# Patient Record
Sex: FEMALE | Race: WHITE
[De-identification: names, ages, dates, MRNs, and addresses within clinical notes are randomized per-mention and may not be internally consistent; named-entity substitution may affect disease eponyms.]

---

## 2015-08-28 VITALS
DIASTOLIC BLOOD PRESSURE: 80 MMHG | SYSTOLIC BLOOD PRESSURE: 156 MMHG | SYSTOLIC BLOOD PRESSURE: 156 MMHG | DIASTOLIC BLOOD PRESSURE: 80 MMHG | DIASTOLIC BLOOD PRESSURE: 80 MMHG | DIASTOLIC BLOOD PRESSURE: 80 MMHG | DIASTOLIC BLOOD PRESSURE: 80 MMHG | SYSTOLIC BLOOD PRESSURE: 156 MMHG | DIASTOLIC BLOOD PRESSURE: 80 MMHG | SYSTOLIC BLOOD PRESSURE: 156 MMHG | SYSTOLIC BLOOD PRESSURE: 156 MMHG | SYSTOLIC BLOOD PRESSURE: 156 MMHG

## 2017-05-22 ENCOUNTER — HOSPITAL ENCOUNTER (OUTPATIENT)
Dept: HOSPITAL 6 - RAD | Age: 82
End: 2017-05-22
Payer: MEDICARE

## 2017-05-22 DIAGNOSIS — M25.551: ICD-10-CM

## 2017-05-22 DIAGNOSIS — M54.5: Primary | ICD-10-CM

## 2017-06-08 ENCOUNTER — HOSPITAL ENCOUNTER (OUTPATIENT)
Dept: HOSPITAL 6 - LAB | Age: 82
End: 2017-06-08
Payer: MEDICARE

## 2017-06-08 ENCOUNTER — HOSPITAL ENCOUNTER (OUTPATIENT)
Dept: HOSPITAL 19 - ZLAB.WCH | Age: 82
End: 2017-06-08

## 2017-06-08 DIAGNOSIS — E11.9: Primary | ICD-10-CM

## 2017-06-08 DIAGNOSIS — I10: ICD-10-CM

## 2017-06-08 DIAGNOSIS — Z01.89: Primary | ICD-10-CM

## 2017-06-15 ENCOUNTER — HOSPITAL ENCOUNTER (OUTPATIENT)
Dept: HOSPITAL 6 - PT | Age: 82
Discharge: HOME | End: 2017-06-15
Payer: MEDICARE

## 2017-06-15 DIAGNOSIS — M25.78: ICD-10-CM

## 2017-06-15 DIAGNOSIS — M47.9: ICD-10-CM

## 2017-06-15 DIAGNOSIS — M54.5: Primary | ICD-10-CM

## 2017-06-15 DIAGNOSIS — M25.551: ICD-10-CM

## 2017-06-15 DIAGNOSIS — M16.11: ICD-10-CM

## 2017-06-15 DIAGNOSIS — Z96.642: ICD-10-CM

## 2017-12-15 ENCOUNTER — HOSPITAL ENCOUNTER (OUTPATIENT)
Dept: HOSPITAL 6 - LAB | Age: 82
End: 2017-12-15
Attending: FAMILY MEDICINE
Payer: MEDICARE

## 2017-12-15 ENCOUNTER — HOSPITAL ENCOUNTER (OUTPATIENT)
Dept: HOSPITAL 19 - ZLAB.WCH | Age: 82
End: 2017-12-15

## 2017-12-15 DIAGNOSIS — Z01.89: Primary | ICD-10-CM

## 2017-12-15 DIAGNOSIS — E11.9: Primary | ICD-10-CM

## 2017-12-15 LAB
ERYTHROCYTE [DISTWIDTH] IN BLOOD BY AUTOMATED COUNT: 13.8 % (ref 11.5–14.5)
HCT VFR BLD AUTO: 44 % (ref 37–47)
HGB BLD-MCNC: 15 G/DL (ref 12.5–16)
MCH RBC QN AUTO: 30 PG (ref 27–31)
MCHC RBC AUTO-ENTMCNC: 34 G/DL (ref 33–37)
MCV RBC AUTO: 89 FL (ref 78–100)
PLATELET # BLD AUTO: 236 K/MM3 (ref 130–400)
PMV BLD AUTO: 11.6 FL (ref 7.4–10.4)
RBC # BLD AUTO: 4.96 M/MM3 (ref 4.1–5.3)
WBC # BLD AUTO: 6.1 K/MM3 (ref 4.8–10.8)

## 2018-12-05 ENCOUNTER — HOSPITAL ENCOUNTER (OUTPATIENT)
Dept: HOSPITAL 6 - LAB | Age: 83
End: 2018-12-05
Attending: FAMILY MEDICINE
Payer: MEDICARE

## 2018-12-05 DIAGNOSIS — E11.9: Primary | ICD-10-CM

## 2018-12-05 LAB
ERYTHROCYTE [DISTWIDTH] IN BLOOD BY AUTOMATED COUNT: 13.2 % (ref 11.5–14.5)
HCT VFR BLD AUTO: 45.3 % (ref 37–47)
HGB BLD-MCNC: 15.1 G/DL (ref 12.5–16)
MCH RBC QN AUTO: 30 PG (ref 27–31)
MCHC RBC AUTO-ENTMCNC: 33 G/DL (ref 33–37)
MCV RBC AUTO: 90 FL (ref 78–100)
PLATELET # BLD AUTO: 216 K/MM3 (ref 130–400)
PMV BLD AUTO: 11.8 FL (ref 7.4–10.4)
RBC # BLD AUTO: 5.03 M/MM3 (ref 4.1–5.3)
WBC # BLD AUTO: 6.1 K/MM3 (ref 4.8–10.8)

## 2018-12-07 ENCOUNTER — HOSPITAL ENCOUNTER (OUTPATIENT)
Dept: HOSPITAL 19 - ZLAB.WCH | Age: 83
End: 2018-12-07

## 2018-12-07 DIAGNOSIS — Z01.89: Primary | ICD-10-CM

## 2019-04-09 ENCOUNTER — HOSPITAL ENCOUNTER (OUTPATIENT)
Dept: HOSPITAL 6 - RAD | Age: 84
End: 2019-04-09
Attending: FAMILY MEDICINE
Payer: MEDICARE

## 2019-04-09 DIAGNOSIS — R06.2: ICD-10-CM

## 2019-04-09 DIAGNOSIS — I51.7: Primary | ICD-10-CM

## 2022-04-06 ENCOUNTER — HOSPITAL ENCOUNTER (INPATIENT)
Dept: HOSPITAL 19 - COL.ER | Age: 87
LOS: 4 days | Discharge: NURSING, REHAB OR LONG-TERM CARE | DRG: 480 | End: 2022-04-10
Attending: INTERNAL MEDICINE | Admitting: INTERNAL MEDICINE
Payer: MEDICARE

## 2022-04-06 VITALS — HEIGHT: 60 IN | BODY MASS INDEX: 41.16 KG/M2 | WEIGHT: 209.66 LBS

## 2022-04-06 VITALS — HEART RATE: 82 BPM | DIASTOLIC BLOOD PRESSURE: 46 MMHG | TEMPERATURE: 97.8 F | SYSTOLIC BLOOD PRESSURE: 138 MMHG

## 2022-04-06 VITALS — DIASTOLIC BLOOD PRESSURE: 73 MMHG | TEMPERATURE: 97.7 F | SYSTOLIC BLOOD PRESSURE: 148 MMHG | HEART RATE: 92 BPM

## 2022-04-06 DIAGNOSIS — E11.65: ICD-10-CM

## 2022-04-06 DIAGNOSIS — J96.01: ICD-10-CM

## 2022-04-06 DIAGNOSIS — Z20.822: ICD-10-CM

## 2022-04-06 DIAGNOSIS — D62: ICD-10-CM
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
## 2022-04-06 DIAGNOSIS — I48.91: ICD-10-CM

## 2022-04-06 DIAGNOSIS — Z66: ICD-10-CM

## 2022-04-06 DIAGNOSIS — J44.1: ICD-10-CM
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
## 2022-04-06 DIAGNOSIS — K21.9: ICD-10-CM

## 2022-04-06 DIAGNOSIS — S72.21XA: Primary | ICD-10-CM

## 2022-04-06 DIAGNOSIS — I50.33: ICD-10-CM

## 2022-04-06 DIAGNOSIS — N39.0: ICD-10-CM

## 2022-04-06 DIAGNOSIS — M19.90: ICD-10-CM

## 2022-04-06 DIAGNOSIS — I08.3: ICD-10-CM

## 2022-04-06 DIAGNOSIS — I49.1: ICD-10-CM
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
## 2022-04-06 DIAGNOSIS — Z79.4: ICD-10-CM

## 2022-04-06 DIAGNOSIS — Z87.891: ICD-10-CM

## 2022-04-06 DIAGNOSIS — I27.20: ICD-10-CM

## 2022-04-06 DIAGNOSIS — I11.0: ICD-10-CM

## 2022-04-06 DIAGNOSIS — E87.2: ICD-10-CM

## 2022-04-06 LAB
ALBUMIN SERPL-MCNC: 3.7 GM/DL (ref 3.4–4.8)
ALP SERPL-CCNC: 93 U/L (ref 40–150)
ALT SERPL-CCNC: 10 U/L (ref 0–55)
ANION GAP SERPL CALC-SCNC: 13 MMOL/L (ref 7–16)
AST SERPL-CCNC: 15 U/L (ref 5–34)
BASOPHILS # BLD: 0.1 K/MM3 (ref 0–0.2)
BASOPHILS NFR BLD AUTO: 0.6 % (ref 0–2)
BILIRUB SERPL-MCNC: 0.9 MG/DL (ref 0.2–1.2)
BUN SERPL-MCNC: 23 MG/DL (ref 10–20)
CALCIUM SERPL-MCNC: 8.9 MG/DL (ref 8.4–10.2)
CHLORIDE SERPL-SCNC: 102 MMOL/L (ref 98–107)
CO2 SERPL-SCNC: 22 MMOL/L (ref 23–31)
CREAT SERPL-SCNC: 0.95 MG/DL (ref 0.57–1.11)
EOSINOPHIL # BLD: 0 K/MM3 (ref 0–0.7)
EOSINOPHIL NFR BLD: 0.3 % (ref 0–4)
ERYTHROCYTE [DISTWIDTH] IN BLOOD BY AUTOMATED COUNT: 13.7 % (ref 11.5–14.5)
GLUCOSE SERPL-MCNC: 346 MG/DL (ref 70–99)
GLUCOSE UR QL STRIP.AUTO: (no result)
GRANULOCYTES # BLD AUTO: 89.3 % (ref 42.2–75.2)
HCT VFR BLD AUTO: 42.8 % (ref 37–47)
HGB BLD-MCNC: 14.1 G/DL (ref 12.5–16)
INR BLD: 1 (ref 0.8–3)
KETONES UR STRIP.AUTO-MCNC: (no result) MG/DL
LYMPHOCYTES # BLD: 0.5 K/MM3 (ref 1.2–3.4)
LYMPHOCYTES NFR BLD: 4.2 % (ref 20–51)
MCH RBC QN AUTO: 31 PG (ref 27–31)
MCHC RBC AUTO-ENTMCNC: 33 G/DL (ref 33–37)
MCV RBC AUTO: 93 FL (ref 80–100)
MONOCYTES # BLD: 0.6 K/MM3 (ref 0.1–0.6)
MONOCYTES NFR BLD AUTO: 4.8 % (ref 1.7–9.3)
NEUTROPHILS # BLD: 10.4 K/MM3 (ref 1.4–6.5)
PH UR STRIP.AUTO: 5 [PH] (ref 5–8)
PLATELET # BLD AUTO: 216 K/MM3 (ref 130–400)
PMV BLD AUTO: 12 FL (ref 7.4–10.4)
POTASSIUM SERPL-SCNC: 4.1 MMOL/L (ref 3.5–4.5)
PROT SERPL-MCNC: 6.6 GM/DL (ref 6.2–8.1)
PROTHROMBIN TIME: 11.1 SECONDS (ref 9.7–12.8)
RBC # BLD AUTO: 4.62 M/MM3 (ref 4.1–5.3)
RBC # UR: (no result) /HPF (ref 0–2)
SODIUM SERPL-SCNC: 137 MMOL/L (ref 136–145)
SP GR UR STRIP.AUTO: 1.01 (ref 1–1.03)
SQUAMOUS # URNS: (no result) /HPF (ref 0–10)
TROPONIN I SERPL-MCNC: 0.03 NG/ML (ref 0–0.03)
URN COLLECT METHOD CLASS: (no result)

## 2022-04-06 PROCEDURE — C1713 ANCHOR/SCREW BN/BN,TIS/BN: HCPCS

## 2022-04-06 PROCEDURE — C1769 GUIDE WIRE: HCPCS

## 2022-04-06 PROCEDURE — A9284 NON-ELECTRONIC SPIROMETER: HCPCS

## 2022-04-06 NOTE — NUR
PATIENT REQUESTING TO EAT, CALLED PROVIDER, ORDER OBTAINED TO FEED PATIENT
THEN MAKE PATIENT NPO AFTER MIDNIGHT.

## 2022-04-06 NOTE — NUR
PATIENT ARRIVED TO ROOM VIA E.R. CART/PCT ACCOMPANIED PATIENT. TELE IN PLACE.
OXYGEN PER OXIMASK. PATIENT VOMITED SMALL AMOUNT LIGHT GREEN EMESIS CLEAR WITH
SOME SMALL FLECKS OF UNDIGESTED FOOD

## 2022-04-07 VITALS — HEART RATE: 83 BPM | SYSTOLIC BLOOD PRESSURE: 111 MMHG | DIASTOLIC BLOOD PRESSURE: 47 MMHG

## 2022-04-07 VITALS — SYSTOLIC BLOOD PRESSURE: 122 MMHG | DIASTOLIC BLOOD PRESSURE: 53 MMHG | HEART RATE: 83 BPM

## 2022-04-07 VITALS — HEART RATE: 80 BPM | SYSTOLIC BLOOD PRESSURE: 126 MMHG | DIASTOLIC BLOOD PRESSURE: 50 MMHG

## 2022-04-07 VITALS — HEART RATE: 81 BPM | TEMPERATURE: 98.8 F | SYSTOLIC BLOOD PRESSURE: 121 MMHG | DIASTOLIC BLOOD PRESSURE: 58 MMHG

## 2022-04-07 VITALS — SYSTOLIC BLOOD PRESSURE: 131 MMHG | HEART RATE: 78 BPM | TEMPERATURE: 98.4 F | DIASTOLIC BLOOD PRESSURE: 37 MMHG

## 2022-04-07 VITALS — SYSTOLIC BLOOD PRESSURE: 140 MMHG | HEART RATE: 82 BPM | TEMPERATURE: 97.6 F | DIASTOLIC BLOOD PRESSURE: 32 MMHG

## 2022-04-07 VITALS — HEART RATE: 79 BPM | DIASTOLIC BLOOD PRESSURE: 49 MMHG | SYSTOLIC BLOOD PRESSURE: 130 MMHG

## 2022-04-07 VITALS — DIASTOLIC BLOOD PRESSURE: 40 MMHG | HEART RATE: 78 BPM | SYSTOLIC BLOOD PRESSURE: 131 MMHG

## 2022-04-07 VITALS — HEART RATE: 80 BPM | SYSTOLIC BLOOD PRESSURE: 139 MMHG | DIASTOLIC BLOOD PRESSURE: 43 MMHG

## 2022-04-07 VITALS — HEART RATE: 88 BPM | SYSTOLIC BLOOD PRESSURE: 124 MMHG | DIASTOLIC BLOOD PRESSURE: 61 MMHG | TEMPERATURE: 98.5 F

## 2022-04-07 VITALS — HEART RATE: 78 BPM | SYSTOLIC BLOOD PRESSURE: 130 MMHG | DIASTOLIC BLOOD PRESSURE: 49 MMHG

## 2022-04-07 PROCEDURE — 0QS636Z REPOSITION RIGHT UPPER FEMUR WITH INTRAMEDULLARY INTERNAL FIXATION DEVICE, PERCUTANEOUS APPROACH: ICD-10-PCS | Performed by: ORTHOPAEDIC SURGERY

## 2022-04-07 NOTE — NUR
PT TAKEN TO SURGERY @ THIS TIME. PT HAS BEEN GIVEN A BED BATH, GOWN HAS BEEN
CHANGED ET PT REPOSITIONED IN BED. PT STATES THAT SHE HAS NO PAIN WHEN LAYING
STILL BUT HAS SEVERE PAIN WITH MOVEMENT. PT BEGINS TO BECOME NAUSEOUS WHEN
HAVING PAIN ET REPOSITIONING IN BED. SHAH CATHETER IN PLACE ET DRAINING
DEPENDENTLY.

## 2022-04-07 NOTE — NUR
RECEIVED REPORT FROM PACU RNANABELLA. WAITING FOR PATIENT ARRIVAL TO ROOM FROM
RECOVERY ROOM. BOTH DAUGHTERS IN ROOM WAITING FOR PATIENT.

## 2022-04-07 NOTE — NUR
met with patient to discuss discharge planning. Patient has a
hip fracture and has not had surgery scheduled at this time. Patient lives
outside of [place] with her daughter, Janie (ph#[phone]) and sees Dr. Almanzar
for primary care. Patient obtains medications from [facility] with no
difficulties and uses a cane for ambulation. Patient reports she is normally
independent with ADLS. SW discussed post acute rehab with patient who is
agreeable that she will need this at time of discharge. Patient's preferences
for rehab are 1) [facility] and 2) [facility]. MIRANDA contacted Cyn at
[facility] and gave referral. MIRANDA also contacted Mahogany at  and faxed referral. SW
contacted patient's daughter, Janie to provide update. Janie advised she will
bring in copy of patient's DPOA-HC and would like to be notified when
patient's surgery is scheduled. MIRANDA provided this request to RN.
 
Discharge Plan: Post acute rehab

## 2022-04-07 NOTE — NUR
PATIENT TO ROOM PER HOSP BED WITH PACU RN TRANSPORTING PATIENT. PATIENT
DROWSY POST OP BUT AROUSES TO NAME, DAUGHTERS VISITED WITH PATIENT BRIEFLY.
DENIES SURGICAL PAIN/NAUSEA CURRENTLY. TELE IN PLACE. IV FLUIDS RUNNING
SLOWLY. SHAH IN PLACE WITH CLEAR MED YELLOW URINE AT THIS TIME.

## 2022-04-08 VITALS — HEART RATE: 76 BPM | SYSTOLIC BLOOD PRESSURE: 126 MMHG | DIASTOLIC BLOOD PRESSURE: 43 MMHG | TEMPERATURE: 98.6 F

## 2022-04-08 VITALS — TEMPERATURE: 98.3 F | HEART RATE: 77 BPM | DIASTOLIC BLOOD PRESSURE: 42 MMHG | SYSTOLIC BLOOD PRESSURE: 128 MMHG

## 2022-04-08 VITALS — HEART RATE: 75 BPM | TEMPERATURE: 97.6 F | SYSTOLIC BLOOD PRESSURE: 112 MMHG | DIASTOLIC BLOOD PRESSURE: 49 MMHG

## 2022-04-08 VITALS — HEART RATE: 85 BPM | TEMPERATURE: 99.1 F | DIASTOLIC BLOOD PRESSURE: 45 MMHG | SYSTOLIC BLOOD PRESSURE: 130 MMHG

## 2022-04-08 VITALS — TEMPERATURE: 99.2 F | SYSTOLIC BLOOD PRESSURE: 111 MMHG | DIASTOLIC BLOOD PRESSURE: 39 MMHG | HEART RATE: 81 BPM

## 2022-04-08 VITALS — HEART RATE: 82 BPM | SYSTOLIC BLOOD PRESSURE: 130 MMHG | DIASTOLIC BLOOD PRESSURE: 55 MMHG | TEMPERATURE: 98.2 F

## 2022-04-08 VITALS — TEMPERATURE: 97.8 F

## 2022-04-08 NOTE — NUR
CONFIRMED WITH ON CALL HOSP REGARDING IV FLUIDS&MEDS POST OP. PROVIDER OKAY
FOR IV FLUIDS TO CONTINUE UNTIL PATIENT IS MORE ALERT AND POST N/V TO RUN AT
50 ML/HR.

## 2022-04-08 NOTE — NUR
faxed clinical updates to patient's second preference, Arsen Sheht. [facility] to view updates electronically.

## 2022-04-08 NOTE — NUR
Message left for Monse at [facility]. Notified Monse of possible discharge on
Sunday.

## 2022-04-08 NOTE — NUR
PT USES CALL LIGHT TO USE BR. PT HAS BEEN UP TO CHAIR WITH PHYSICAL THERAPY
ONCE TODAY, IS ENCOURAGED TO STAND ET USE BSC. PT REQUIRES MAX 2 ASSIST TO SIT
UP IN BED ET GET FEET ON FLOOR. PT IS ABLE TO STAND BUT IS UNABLE TO TAKE
STEPS. PT IS ASSISTED BACK INTO BED ET PLACED ONTO BEDPAN TO URINATE. PT IS
INSTRUCTED TO USE THE CALL LIGHT WHEN FINISHED, VERBALIZES UNDERTSTANDING.
CALL LIGHT WITHIN REACH.

## 2022-04-08 NOTE — NUR
IV SALINE LOCKED TO INT @ THIS TIME. PT IS RESTING QUIETLY IN BED, DENIES PAIN
@ REST, A&OX3. DRINKING WATER ET DENIES NAUSEA. BREAKFAST HAS BEEN ORDERED
FROM KITCHEN. PT DENIES SOB, O2 ON @ 3L NC. BED ALARM IS ON. CALL LIGHT WITHIN
REACH.

## 2022-04-08 NOTE — NUR
MIRANDA spoke with Kimberlee at [facility]. Patient's admission on Sunday still pending
review from weekend physician. Notified Kimberlee that i will be on tomorrow and
will touch base again with nursing staff.

## 2022-04-09 VITALS — HEART RATE: 81 BPM | SYSTOLIC BLOOD PRESSURE: 125 MMHG | DIASTOLIC BLOOD PRESSURE: 87 MMHG | TEMPERATURE: 97.8 F

## 2022-04-09 VITALS — HEART RATE: 96 BPM | DIASTOLIC BLOOD PRESSURE: 73 MMHG | TEMPERATURE: 98.3 F | SYSTOLIC BLOOD PRESSURE: 161 MMHG

## 2022-04-09 VITALS — SYSTOLIC BLOOD PRESSURE: 124 MMHG | TEMPERATURE: 97.7 F | HEART RATE: 84 BPM | DIASTOLIC BLOOD PRESSURE: 40 MMHG

## 2022-04-09 VITALS — HEART RATE: 85 BPM | DIASTOLIC BLOOD PRESSURE: 46 MMHG | TEMPERATURE: 98 F | SYSTOLIC BLOOD PRESSURE: 156 MMHG

## 2022-04-09 VITALS — HEART RATE: 118 BPM | TEMPERATURE: 99 F | DIASTOLIC BLOOD PRESSURE: 70 MMHG | SYSTOLIC BLOOD PRESSURE: 134 MMHG

## 2022-04-09 VITALS — HEART RATE: 81 BPM | TEMPERATURE: 97.8 F | DIASTOLIC BLOOD PRESSURE: 37 MMHG | SYSTOLIC BLOOD PRESSURE: 138 MMHG

## 2022-04-09 LAB
ANION GAP SERPL CALC-SCNC: 10 MMOL/L (ref 7–16)
BASOPHILS # BLD: 0 K/MM3 (ref 0–0.2)
BASOPHILS NFR BLD AUTO: 0.1 % (ref 0–2)
BUN SERPL-MCNC: 44 MG/DL (ref 10–20)
CALCIUM SERPL-MCNC: 8.2 MG/DL (ref 8.4–10.2)
CHLORIDE SERPL-SCNC: 105 MMOL/L (ref 98–107)
CO2 SERPL-SCNC: 26 MMOL/L (ref 23–31)
CREAT SERPL-SCNC: 1.29 MG/DL (ref 0.57–1.11)
EOSINOPHIL # BLD: 0.1 K/MM3 (ref 0–0.7)
EOSINOPHIL NFR BLD: 0.6 % (ref 0–4)
ERYTHROCYTE [DISTWIDTH] IN BLOOD BY AUTOMATED COUNT: 14.2 % (ref 11.5–14.5)
GLUCOSE SERPL-MCNC: 93 MG/DL (ref 70–99)
GRANULOCYTES # BLD AUTO: 78.9 % (ref 42.2–75.2)
HCT VFR BLD AUTO: 31.2 % (ref 37–47)
HGB BLD-MCNC: 10.5 G/DL (ref 12.5–16)
LYMPHOCYTES # BLD: 0.8 K/MM3 (ref 1.2–3.4)
LYMPHOCYTES NFR BLD: 7.5 % (ref 20–51)
MAGNESIUM SERPL-MCNC: 2.3 MG/DL (ref 1.6–2.6)
MCH RBC QN AUTO: 31 PG (ref 27–31)
MCHC RBC AUTO-ENTMCNC: 34 G/DL (ref 33–37)
MCV RBC AUTO: 92 FL (ref 80–100)
MONOCYTES # BLD: 1.3 K/MM3 (ref 0.1–0.6)
MONOCYTES NFR BLD AUTO: 12.6 % (ref 1.7–9.3)
NEUTROPHILS # BLD: 8 K/MM3 (ref 1.4–6.5)
PLATELET # BLD AUTO: 180 K/MM3 (ref 130–400)
PMV BLD AUTO: 12.1 FL (ref 7.4–10.4)
POTASSIUM SERPL-SCNC: 4.1 MMOL/L (ref 3.5–4.5)
RBC # BLD AUTO: 3.4 M/MM3 (ref 4.1–5.3)
SODIUM SERPL-SCNC: 141 MMOL/L (ref 136–145)

## 2022-04-09 NOTE — NUR
spoke with Kimberlee at [facility]. She is going to check with the
accepting physician today to determine if they are able to accept the patient
for transfer tomorrow. She will call back once she has an answer.

## 2022-04-09 NOTE — NUR
PATIENT ALERT AND ORIENTED BUT DROWSY. X2 ASSIST TO BEDPAN. C/O PAIN ONLY WHEN
MOVING, OTHERWISE DENIES PAIN. MEDS ADMINISTERED PER EMAR. X3 GAMMA INCISIONS
ARE CDI WITH GAUZE AND TEGADERM. BLOOD SUGAR 186, SSI PER ORDER. REMAINS ON 3
L O2 VIA NC.

## 2022-04-09 NOTE — NUR
PATIENT C/O HEARTBURN, CALL TO [facility] AND ORDER FOR PROTONIX NOW. NO RELIEF
AFTER THE PROTONIX AND ADDITIONAL CALL PLACED TO [facility]. GI COCKTAIL ORDERED
AND ADMINISTERED PER EMAR. STILL NO RELIEF. NOTICED ON TELE MONITOR PT HR IN
30s. PATIENT ALERT AND ORIENTED BUT FATIGUED AND C/O HEARTBURN STILL. VSS.
BLOOD SUGAR WNL. ORDER FOR EKG. CALL PLACED TO [facility] TO NOTIFY OF PT STATUS,
STAT LABS ORDERED. AWAITING FOR THE RESULTS.

## 2022-04-09 NOTE — NUR
PT UP TO RECLINER WITH MAX ASSIST X3. UNABLE TO BEAR WEIGHT THROUGH LEGS.
PASSED ANA AND VOIDED RETURNED TO RECLINER. BILATERAL DIABETIC VENOUS STASIS
TO LOWER EXT. NO OOZING OR WEAPING NOTED. LEGS OPEN TO AIR AT THIS TIME.

## 2022-04-09 NOTE — NUR
PATIENT ALERT AND ORIENTED. IV TO R UPPER ARM INFILTRATED AND REMOVED,
CATHETER INTACT. RESTARTED 22 G X1 ATTEMPT TO L HAND. BLOOD SUGAR 193, SSI PER
ORDER. REMAINS ON 2 L O2 VIA NC. X3 INCISIONS TO R GAMMA NAIL SITE CDI WITH
GAUZE AND TEGADERM. BLE ELEVATED AND OPEN TO AIR. DENYING PAIN AS LONG AS SHE
IS IN BED. PUREWICK IN PLACE TO LOW CONTINUOUS SUCTION.

## 2022-04-10 ENCOUNTER — HOSPITAL ENCOUNTER (INPATIENT)
Dept: HOSPITAL 6 - MED/SURG | Age: 87
LOS: 31 days | Discharge: HOME HEALTH | DRG: 561 | End: 2022-05-11
Attending: GENERAL PRACTICE | Admitting: GENERAL PRACTICE
Payer: MEDICARE

## 2022-04-10 VITALS — HEART RATE: 89 BPM | TEMPERATURE: 98.5 F | DIASTOLIC BLOOD PRESSURE: 47 MMHG | SYSTOLIC BLOOD PRESSURE: 144 MMHG

## 2022-04-10 VITALS — SYSTOLIC BLOOD PRESSURE: 125 MMHG | DIASTOLIC BLOOD PRESSURE: 37 MMHG | HEART RATE: 82 BPM | TEMPERATURE: 99 F

## 2022-04-10 VITALS — SYSTOLIC BLOOD PRESSURE: 147 MMHG | DIASTOLIC BLOOD PRESSURE: 80 MMHG

## 2022-04-10 VITALS — SYSTOLIC BLOOD PRESSURE: 125 MMHG | TEMPERATURE: 98.1 F | HEART RATE: 88 BPM | DIASTOLIC BLOOD PRESSURE: 36 MMHG

## 2022-04-10 VITALS — HEIGHT: 62.99 IN | BODY MASS INDEX: 32.62 KG/M2 | WEIGHT: 184.09 LBS

## 2022-04-10 DIAGNOSIS — S72.91XD: Primary | ICD-10-CM

## 2022-04-10 DIAGNOSIS — I87.8: ICD-10-CM

## 2022-04-10 DIAGNOSIS — Z96.642: ICD-10-CM

## 2022-04-10 DIAGNOSIS — G47.00: ICD-10-CM

## 2022-04-10 DIAGNOSIS — I48.91: ICD-10-CM

## 2022-04-10 DIAGNOSIS — I11.0: ICD-10-CM

## 2022-04-10 DIAGNOSIS — E11.9: ICD-10-CM

## 2022-04-10 DIAGNOSIS — Z79.891: ICD-10-CM

## 2022-04-10 DIAGNOSIS — E87.6: ICD-10-CM

## 2022-04-10 DIAGNOSIS — I50.9: ICD-10-CM

## 2022-04-10 DIAGNOSIS — E66.9: ICD-10-CM

## 2022-04-10 DIAGNOSIS — Z79.4: ICD-10-CM

## 2022-04-10 DIAGNOSIS — M17.11: ICD-10-CM
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
## 2022-04-10 LAB
ANION GAP SERPL CALC-SCNC: 8 MMOL/L (ref 7–16)
BUN SERPL-MCNC: 44 MG/DL (ref 10–20)
CALCIUM SERPL-MCNC: 8.2 MG/DL (ref 8.4–10.2)
CHLORIDE SERPL-SCNC: 104 MMOL/L (ref 98–107)
CO2 SERPL-SCNC: 28 MMOL/L (ref 23–31)
CREAT SERPL-SCNC: 1 MG/DL (ref 0.57–1.11)
GLUCOSE SERPL-MCNC: 64 MG/DL (ref 70–99)
HCT VFR BLD AUTO: 28.8 % (ref 37–47)
HGB BLD-MCNC: 9.4 G/DL (ref 12.5–16)
MAGNESIUM SERPL-MCNC: 2.3 MG/DL (ref 1.6–2.6)
POTASSIUM SERPL-SCNC: 3.8 MMOL/L (ref 3.5–4.5)
SODIUM SERPL-SCNC: 140 MMOL/L (ref 136–145)

## 2022-04-10 NOTE — NUR
Per Dr. Betancourt, patient to have fabio boots applied tomorrow during the day for
BLE edema and skin issues.

## 2022-04-10 NOTE — NUR
Patient resting in bed alert and oriented x 4. Denies pain at rest. 2 assist
onto bed pain with no results. Good caleb-care provided and pure wick applied
to suction. Patient has red cracked skin to right groin crease and panus.
Areas cleaned and nystatin powder applied. Accu check 158 and Dr. Betancourt
notified.

## 2022-04-10 NOTE — NUR
informed patient is ready fr transfer/discharge. SW contacted
[facility] to confirm their census is capped and unable to take today. This
 contacted [facility]; Jeana informs due to no
communication, they are unable to accept today, but possibly tomorrow. Rene GARCIA, Natalie Marion Supervisor notified.
 
15:43: Natalie Marion Supervisor informs has spoken to [facility], and patient
bed available. Peer to peer communication completed and patient accepted. SW
collaborated with Natalie Marion Supervisor, Dr. Andres, Rene GARCIA, and [facility] to coordinate patient transport. EMS transfer orders placed in
patient chart, copy sent with pt clinical transfer packet. Patient daughter
Janie [phone] updated on plan of care and is in support.
 
*Discharge plan: Patient transfer via [facility] to [facility].*

## 2022-04-10 NOTE — NUR
REPORT TO PEREZ GARCIA @[facility]. PT TO TRANSFER TO [facility] PER EMS AT THIS
TIME.

## 2022-04-10 NOTE — NUR
Patient resting in bed and awakened by nurse. Alert to self, , month, time
of day and year. States she's in a care home and reoriented to in [facility]. Speech
clear and appropriate. Denies pain. Up to the bathroom with steady gait and
brushes teeth. Rests back in bed. HS meds reviewed and swallowed without
problems. Lights turned out. Bed alarm on and call light in reach. States her
friends brought in cream cup earlier and declines snack.

## 2022-04-10 NOTE — NUR
PATIENT ADMIT FROM [facility]IA [facility] POST STATUS RIGHT HIP REPAIR. PATIENT
IS A&0X4. PATIENT IS A 2-3 ASSIST, DENIES PAIN WHEN LYING IN BED, STATES PAIN
WITH MOVEMENT OF 8/10. PATIENT BLE NOTED WITH SCALING/SCABBING FROM MID SHIN
TO TOES. PROVIDER AWARE. PATIENT/FAMILY ORIENTED TO ROOM/FACILITY. ALL
QUESTIONS ANSWERED. PATIENT RESTING IN BED WITH FAMILY AT BEDSIDE.  PATIENT
DENIES NEEDS OR COMPLAINTS AT THIS TIME. BED IN LOWEST LOCKED POSTION, CALL
LIGHT WITHIN REACH.

## 2022-04-10 NOTE — NUR
Dr. Betancourt into see patient and patient assisted onto bedpan. Voids but no bm.
HS meds reviewed and given along with tylenol for pain prevention. Reports
has achy pain right hip with movement. Took milk for snack.

## 2022-04-10 NOTE — NUR
PT UP TO BSC WITH MAX ASSIST X2. VOIDED AND HAD MED BM. PT REPORTS HAVING
SEVERAL STOOLS SINCE RECIEVING BOWEL MEDICATIONS. PT'S BLE WITH DIABETIC
VENOUS STASIS ULCERS DRY WITH NO DRAINAGE NOTED. PLAN ON TRANSFER TO SWING BED
OR REHAB FACILITY LATER TODAY.

## 2022-04-11 VITALS — DIASTOLIC BLOOD PRESSURE: 78 MMHG | SYSTOLIC BLOOD PRESSURE: 152 MMHG

## 2022-04-11 VITALS — SYSTOLIC BLOOD PRESSURE: 153 MMHG | DIASTOLIC BLOOD PRESSURE: 65 MMHG

## 2022-04-11 LAB
APPEARANCE UR: CLEAR
BASOPHILS # BLD: 0.02 K/MM3 (ref 0.02–0.1)
BILIRUB UR QL STRIP.AUTO: NEGATIVE
CALCIUM SERPL-MCNC: 8.8 MG/DL (ref 8.3–10.5)
CO2 SERPL-SCNC: 26 MMOL/L (ref 23–31)
COLOR UR AUTO: YELLOW
EOSINOPHIL # BLD: 0.16 K/MM3 (ref 0.04–0.4)
EOSINOPHIL NFR BLD: 1.8 % (ref 1–5)
ERYTHROCYTE [DISTWIDTH] IN BLOOD BY AUTOMATED COUNT: 13.9 % (ref 11.5–14.5)
GLUCOSE SERPL-MCNC: 226 MG/DL (ref 65–105)
GLUCOSE UR QL STRIP.AUTO: NEGATIVE
HCT VFR BLD AUTO: 33.1 % (ref 37–47)
HGB BLD-MCNC: 10.7 G/DL (ref 12.5–16)
KETONES UR QL STRIP.AUTO: NEGATIVE
LEUKOCYTE ESTERASE UR QL STRIP: NEGATIVE
LYMPHOCYTES # BLD: 0.62 K/MM3 (ref 1.5–4)
MCH RBC QN AUTO: 31 PG (ref 27–31)
MCHC RBC AUTO-ENTMCNC: 32 G/DL (ref 33–37)
MCV RBC AUTO: 94 FL (ref 78–100)
MONOCYTES # BLD: 1 K/MM3 (ref 0.2–0.8)
MUCOUS THREADS URNS QL MICRO: PRESENT
NEUTROPHILS # BLD: 7.05 K/MM3 (ref 1.4–6.5)
NITRITE UR QL STRIP: NEGATIVE
PH UR STRIP.AUTO: 5 [PH] (ref 5–8)
PLATELET # BLD AUTO: 187 K/MM3 (ref 130–400)
PMV BLD AUTO: 11.9 FL (ref 7.4–10.4)
POTASSIUM SERPL-SCNC: 4.2 MMOL/L (ref 3.5–5.1)
PROT ?TM UR-MCNC: (no result) MG/DL
RBC # BLD AUTO: 3.51 M/MM3 (ref 4.1–5.3)
RBC UR QL AUTO: NEGATIVE
SODIUM SERPL-SCNC: 140 MMOL/L (ref 136–145)
SP GR UR STRIP.AUTO: 1.01 (ref 1–1.03)
UROBILINOGEN UR-MCNC: NORMAL MG/DL
WBC # BLD AUTO: 9 K/MM3 (ref 4.8–10.8)
WBC #/AREA URNS HPF: (no result) /HPF (ref 0–3)

## 2022-04-11 NOTE — NUR
[facility] called and cancelled Rima's appointment on 4/15/22 at 2 will
rescedule follow up before discharges from [facility].

## 2022-04-11 NOTE — NUR
RELATING TO SKIN CARE. PT WAS SITTING IN STOOL UPON ENTERING . PT ASSISTED
TO BED OUT OF CHAIR WITH SIT TO STAND DEVICE WITH 2 PERSON ASSIST. PT CLEANED.
A LARGE SKIN TAG IS NOTED ON RT BUTTOCKS THE SKIN TAG HAS A BLOOD BLISTER AND
IS RED. SKIN BOND WAS APPLIED AFTER CLEANSING OF AREA. SKIN BETWEEN BUTTOCKS
RED. PT HAS RAW SKIN IN FOLDS OF GROIN. THESE WERE CLEANSED AND ANTIFUNGAL
POWDER PLACED. PT HAS 3 BANDAGES ON RT UPPER LATERAL LEG. THERE IS A FLUID
FILLED BLISTER NOTED NEAR MOST SUPERIOR BANDAGE. ONCE SETTLED IN BED AND
CLEANED A PUREWICK DEVICE WAS PLACED AND PT TURNED TO LT SIDE.

## 2022-04-11 NOTE — NUR
Called [facility] in [place] - Dr. Umanzor to cancel Rima's
appointment on 4/15// at 2 pm.  Will reschedule appointment for an
after discharge day and time.

## 2022-04-11 NOTE — NUR
Patient initially felt she needed to have bm but then urinated via pure wick
catheter and sensation left. Was going to get cath ua but voided at this time.
Tylenol given for 8/10 pain to right hip with movement. Dull pain with slow
movement and stabbing pain with quick movements. Patients states "yes" to
sleeping well this noc.

## 2022-04-12 VITALS — SYSTOLIC BLOOD PRESSURE: 144 MMHG | DIASTOLIC BLOOD PRESSURE: 68 MMHG

## 2022-04-12 VITALS — SYSTOLIC BLOOD PRESSURE: 169 MMHG | DIASTOLIC BLOOD PRESSURE: 75 MMHG

## 2022-04-12 NOTE — NUR
PATIENT UP TO CHIAR FOR EVENING MEAL, FEET ELEVATED ON STOOL. PATIENT
REMAINS PLEASENT AND COOPERATIVE WITH CARES. PRN APAP GIVEN FOR PAIN
THROUGHOUT THE DAY. PATIENT USING SIT TO STAND LIFT FOR TRANSFERS WITHOUT
DIFFICULTY. PATIENT OFFERS NO NEEDS OR COMPLAINTS AT THIS TIME. CHAIR ALARM
ON. CALL LIGHT WITHIN REACH.

## 2022-04-12 NOTE — NUR
REPORT RECEIVED FROM ARDHA SHEA. PATIENT RESTING IN BED WITH EYES CLOSED. BED
IN LOWEST LOCKED POSTION, CALL LIGHT WITHIN REACH.

## 2022-04-12 NOTE — NUR
IN REGUARDS TO PT SKIN. RT LATERAL UPPER LEG IS EDEMITIS WITH 3 SMALL
DRESSING. BLISTER NEAR UPPER DRESSING IS NO LONGER FLUID FILLED. LT UPPER LEG
IS EDEMITIS. LARGE SKIN TAG OR GROWTH ON RT BUTTOCKS HAS BRUISING AND IN SKIN
FOLDS CREASES ARE RED AND RAW. PT HAS NACHO BOOTS IN PLACE BILATERALLY FOR DRY
SCALEY SKIN. PT DENIES PAIN AT THIS TIME.

## 2022-04-12 NOTE — NUR
PATIENT UP TO CHAIR FOR MORNING MEAL. PLEASENT AND COOPERATIVE WITH CARES.
STATES PAIN PRESENT WITH MOVEMENT, NOT WHILE "SITTING STILL". PATIENT HAS C/O
NAUSEA, PRN ZOFRAN GIVEN. PATIENT DENIES OTHER NEEDS OR COMPLAINTS AT THIS
TIME. R HIP INCISION DRESSINGS CLEAN DRY AND INTACT, NACHO BOOTS ON BLE. CHAIR
ALARM ON, CALL LIGHT WITHIN REACH.

## 2022-04-13 VITALS — DIASTOLIC BLOOD PRESSURE: 64 MMHG | SYSTOLIC BLOOD PRESSURE: 125 MMHG

## 2022-04-13 VITALS — SYSTOLIC BLOOD PRESSURE: 134 MMHG | DIASTOLIC BLOOD PRESSURE: 57 MMHG

## 2022-04-13 NOTE — NUR
Rings call light. "Just can't get comfortable". Denies pain. Adjusted in bed
by staff. Heels floated on pillow. Tylenol 650 MG given.

## 2022-04-13 NOTE — NUR
Bladder scan patient as she had not voided since 0745, revealed 680 mls total,
pt able to get up with therapy and assist of 2 staff to stand and then placed
commode behind her and she was able to void 250 mls and have liquid stool. Pt
did not tolerate pain well, used sit-to-stand to get back to bed and do
therapy in bed with PT. PRN meds have been given.

## 2022-04-13 NOTE — NUR
Pt rested this afternoon, able to nap for some time but WBG low upon waking,
was 40. Snack provided and pt feeling better, able to get up to commode with
sit-to-stand lift and then to rcliner, did void this afternoon on commode. PRN
pain meds given over shift and educated on need to manage pain so she is
willing to move and get to commode, chair and work with therapy. Pt agreeable
to rotating pain meds and trying to keep moving. PRN nausea meds given at
times over shift for nausea that starts after moving, Denies needs, will give
report to nightshift nurse who will resume care.

## 2022-04-13 NOTE — NUR
Rings call light and requests that her blood sugar be check. Accu-check 122,
states she usually runs in the 200's. Unable to get comfortable. Staff
repositions. SAO2 99% on 2.5 L/NC. Oxygen turned down to 1.5 L/NC. Patient
states she does not wear oxygen at home.

## 2022-04-13 NOTE — NUR
Assessment charted. Pt on commode, had loose brown BM and states she also
urinated in commode. Sit-to-stand used to g et pt up and cleaned, pericare
provided, cream applied to sacral area and powder to pannus. R superior
surgical site has some blistering from previous dressing. Distal sites are
CDI with gauze and tegaderm. Pt feeling nauseated from using lift, will
provide PRN nausea and pain meds for discomfort in R hip. Nakita boots
bilaterally for scaley legs. Will continue to monitor.

## 2022-04-13 NOTE — NUR
Patients accu check 139 and scheduled levemir given. HS meds along with
roxycodone for right hip pain and zofran to prevent nausea given. 2 assist via
sit to stand lift to BSC and voids 200mls mega concentrated urine mixed with
small loose bm. Susana-care done. Max assist to rest back in bed. BLE elevated
on pillows. HS snack was given earlier.

## 2022-04-14 VITALS — SYSTOLIC BLOOD PRESSURE: 135 MMHG | DIASTOLIC BLOOD PRESSURE: 68 MMHG

## 2022-04-14 VITALS — SYSTOLIC BLOOD PRESSURE: 151 MMHG | DIASTOLIC BLOOD PRESSURE: 77 MMHG

## 2022-04-14 NOTE — NUR
Pt alert and oriented x4. Pt states had diarrhea this morning and refuses
colace. Pt states she is at 0/10 for pain if she doesn't move.  She rates pain
at 7/10 with movement. Advised pt that movement is necessary to keep ROM,
avoid constipation, & keep blood flowing for healing.  Pt voices
understanding. Pt swallowed pills well.
 
Pt was tearful and states that she feels like she is "taking up
space that someone else could use better". Pt c/o feeling "useless" & states
that her "daughter made a mistake by coming home to care for her father and
now me." Pt apologized for being tearful.  This nurse advised that this time
with her and her daughter are important for both of them and encouraged the pt
to talk and voice her feelings. Pt voiced understanding and agreement.
 
This nurse adjusted BLE on pillow and elevated.  Pt resting in chair.  Chair
alarm on and call light within reach.

## 2022-04-14 NOTE — NUR
Report received from Lilliana /Student RN. Patient resting supine in bed.
Oxygen increased from 0.5L/NC to 2L/NC due to elevated carboxyhemoglobin level
of 2.8 from facility gas leak. Patient made aware of this. A/O x4. Denies pain
at this time. Denies nausea. Discussed taking pain medication on a more
reqular basis to stay ahead of the pain, agreeable to plan.

## 2022-04-14 NOTE — NUR
Patient called for assist and felt her blood sugar might be low. Anxious. VSS
and blood sugar 122. Offered tylenol for right hip pain when moved and given.
Calms shortly after.

## 2022-04-14 NOTE — NUR
CNA reports to this nurse that patient is experiencing n/v. Upon assessment,
patient denies current pain. She states, "as long as I don't move, it doesn't
hurt." Pt states that she experiences n/v with pain and reports that she has
n/v since admission.  Pt denies headache & blurred vision. Pt reports loss of
appetite. Pt unable to eat dinner. PRN medication given. Pt resting in bed,
call light within reach.

## 2022-04-14 NOTE — NUR
Pt moved from room 308 to 206. Patient in much better mood this afternoon.
She spoke with this nurse during change of dressing about her children and
home. No signs of mental distress displayed this morning. Pt complains of hip
pain after PT. She says that it is relieved with rest and requests to rest bed
after PT and dressing change.  Patient alert & oriented x4. CNA at bedside
upon leaving the room.

## 2022-04-14 NOTE — NUR
Rechecked pts SA02 while in bed.  She was speaking with  while they
were drawing labs.  SA02 steady @ 94% with pulse at 75.  Lab techs at bedside
when this nurse left.

## 2022-04-15 VITALS — DIASTOLIC BLOOD PRESSURE: 65 MMHG | SYSTOLIC BLOOD PRESSURE: 157 MMHG

## 2022-04-15 VITALS — SYSTOLIC BLOOD PRESSURE: 158 MMHG | DIASTOLIC BLOOD PRESSURE: 70 MMHG

## 2022-04-15 LAB
ALBUMIN SERPL-MCNC: 2.8 G/DL (ref 3.4–4.8)
ALT SERPL-CCNC: 9 U/L (ref 0–55)
AST SERPL-CCNC: 16 U/L (ref 5–34)
BASOPHILS # BLD: 0.04 K/MM3 (ref 0.02–0.1)
BILIRUB SERPL-MCNC: 1.3 MG/DL (ref 0.2–1.2)
CALCIUM SERPL-MCNC: 8.3 MG/DL (ref 8.3–10.5)
CO2 SERPL-SCNC: 30 MMOL/L (ref 23–31)
EOSINOPHIL # BLD: 0.18 K/MM3 (ref 0.04–0.4)
EOSINOPHIL NFR BLD: 2.3 % (ref 1–5)
ERYTHROCYTE [DISTWIDTH] IN BLOOD BY AUTOMATED COUNT: 14.7 % (ref 11.5–14.5)
GLUCOSE SERPL-MCNC: 140 MG/DL (ref 65–105)
HCT VFR BLD AUTO: 30.5 % (ref 37–47)
HGB BLD-MCNC: 9.9 G/DL (ref 12.5–16)
LYMPHOCYTES # BLD: 0.78 K/MM3 (ref 1.5–4)
MCH RBC QN AUTO: 31 PG (ref 27–31)
MCHC RBC AUTO-ENTMCNC: 33 G/DL (ref 33–37)
MCV RBC AUTO: 96 FL (ref 78–100)
MONOCYTES # BLD: 0.99 K/MM3 (ref 0.2–0.8)
NEUTROPHILS # BLD: 5.81 K/MM3 (ref 1.4–6.5)
PLATELET # BLD AUTO: 259 K/MM3 (ref 130–400)
PMV BLD AUTO: 11.4 FL (ref 7.4–10.4)
POTASSIUM SERPL-SCNC: 4 MMOL/L (ref 3.5–5.1)
PROT SERPL-MCNC: 5 G/DL (ref 6.2–8.1)
RBC # BLD AUTO: 3.19 M/MM3 (ref 4.1–5.3)
SODIUM SERPL-SCNC: 141 MMOL/L (ref 136–145)
WBC # BLD AUTO: 7.9 K/MM3 (ref 4.8–10.8)

## 2022-04-15 NOTE — NUR
Poison control contacted per verbal order by CARL Yo. Poison control
confirms that moving the patient away from exposure, applying oxygen and
continuing to monitor were proper interventions for carbon monoxide toxicity.
They recommend further dx studies such as troponin, EKG and lactic acid.
Information given to CARL Rosas. See new orders. Poison control will
contact this nurse in a few hours with f/u results and set of VS.
 
Faxed information regarding request for exposure at the [facility]. Fax recieved and given to . Risk managment to f/u.

## 2022-04-15 NOTE — NUR
Patient has been resting quietly with oxygen in place at 2L/NC. Has oxycodone
with HS medications for pain control. Took with applesauce and saltine
cracker. No nausa reported.

## 2022-04-15 NOTE — NUR
Up to BSC via Sit-stand lift. Voids and had small soft formed BM. Assisted
back to bed. No grimace noted. States "it didn't hurt as bad this time.".
Reinforced need to keep pain medication on board. Denies pain at rest. Still
unable to lift or  legs on her own. Relys on staff  movenment on and
in and out of bed. Denies nausea through the night, states she slept well.
SAO2 96-97% on 2L/NC.

## 2022-04-15 NOTE — NUR
Non-rebreather removed with 10L of oxygen. PS02 97% with 2L per NC applied. No
c/o SOA or discomfort. She states, "I feel so much better with that oxygen."

## 2022-04-15 NOTE — NUR
Poison control contacted this nurse. VSS, troponin WNL, Lactic acid WNL, EKG
WNL for patient per CARL Yo. All findings reported to poison control.
Information reviewed, will no longer need further tx and/or interventions for
carbonmonoxide toxicity, per poision control.

## 2022-04-15 NOTE — NUR
Awkens easily for AM  medication. Oxycodone given for pain control. Takes with
applesauce. Denies pain or needs.

## 2022-04-15 NOTE — NUR
Assessment completed at 0836
alert & oriented x4.  Sitting with HOB raised about 45 degrees.  Complains
of SOB and nausea.  This nurse notes light wheezing in left lobes of lungs.
Report to CARL Miguel. Assisted with help of CNA to bedside commode & then
to chair to be more upright, feet elevated on pillow. @ 0959 moved pt to 10L
oxygen and non-rebreather mask for 1 hour.  After 1 hour pt states that nausea
was relieved and reports that she is no longer feeling SOB. Pt in chair with
feet elevated, completing breathing treatment, call light on tray table.

## 2022-04-16 VITALS — DIASTOLIC BLOOD PRESSURE: 60 MMHG | SYSTOLIC BLOOD PRESSURE: 178 MMHG

## 2022-04-16 VITALS — SYSTOLIC BLOOD PRESSURE: 177 MMHG | DIASTOLIC BLOOD PRESSURE: 66 MMHG

## 2022-04-16 NOTE — NUR
Patient A&Ox4, 1L per NC, no c/o of pain or discomfort at rest. Swallowed
pills whole with water. Eating breakfast. Transfered with sit to stand from
bed to commode and commode to chair. Reports upset stomach with pain
medication. Suggest taking food with PRN medications in between meals. Patient
agreed. Reports she slept well last night. Last BM 04/16/22. Encouraged to
work with staff with weekend restorativie program. Patient agreed. Chair in
locked position. Call light within reach.

## 2022-04-16 NOTE — NUR
Pt states some right leg/hip pain that feels like muscle spasms. Offered pt
pain medication and pt accepted. No further needs at this time.

## 2022-04-16 NOTE — NUR
Pt resting in bed with eyes closed and no signs of distress or discomfort
noted at this time. Call light in reach, bed alarm on.

## 2022-04-16 NOTE — NUR
Up to BSC via Sit-stand lift and 2 assist. Has some grimacing when going from
lying to sitting position. Voids and has medium soft BM. Assisted back to bed.
Wt obtained. 210.9 down 0.3LBs. Patient does not feel like she is voiding
enough. Some pursed lipped breating noted with repositoning with lift. Settles
after back in bed.

## 2022-04-16 NOTE — NUR
AM medication taken. Oxycodone 5 MG given for pain control. Resting in bed
with HOB elevated. Denies wants or needs.

## 2022-04-16 NOTE — NUR
Report received from JOSH Cunha RN and care assumed. Pt resting in chair with
eyes closed and no signs of distress or discomfort noted. Call light in reach,
chair alarm on.

## 2022-04-17 VITALS — DIASTOLIC BLOOD PRESSURE: 53 MMHG | SYSTOLIC BLOOD PRESSURE: 164 MMHG

## 2022-04-17 VITALS — SYSTOLIC BLOOD PRESSURE: 156 MMHG | DIASTOLIC BLOOD PRESSURE: 74 MMHG

## 2022-04-18 VITALS — DIASTOLIC BLOOD PRESSURE: 67 MMHG | SYSTOLIC BLOOD PRESSURE: 150 MMHG

## 2022-04-18 VITALS — DIASTOLIC BLOOD PRESSURE: 72 MMHG | SYSTOLIC BLOOD PRESSURE: 108 MMHG

## 2022-04-18 LAB
BASOPHILS # BLD: 0.07 K/MM3 (ref 0.02–0.1)
CALCIUM SERPL-MCNC: 8.9 MG/DL (ref 8.3–10.5)
CO2 SERPL-SCNC: 30 MMOL/L (ref 23–31)
EOSINOPHIL # BLD: 0.18 K/MM3 (ref 0.04–0.4)
EOSINOPHIL NFR BLD: 2.2 % (ref 1–5)
ERYTHROCYTE [DISTWIDTH] IN BLOOD BY AUTOMATED COUNT: 15.9 % (ref 11.5–14.5)
GLUCOSE SERPL-MCNC: 95 MG/DL (ref 65–105)
HCT VFR BLD AUTO: 33.1 % (ref 37–47)
HGB BLD-MCNC: 10.5 G/DL (ref 12.5–16)
LYMPHOCYTES # BLD: 0.74 K/MM3 (ref 1.5–4)
MCH RBC QN AUTO: 31 PG (ref 27–31)
MCHC RBC AUTO-ENTMCNC: 32 G/DL (ref 33–37)
MCV RBC AUTO: 97 FL (ref 78–100)
MONOCYTES # BLD: 0.96 K/MM3 (ref 0.2–0.8)
NEUTROPHILS # BLD: 6.01 K/MM3 (ref 1.4–6.5)
PLATELET # BLD AUTO: 143 K/MM3 (ref 130–400)
PMV BLD AUTO: 11.2 FL (ref 7.4–10.4)
POTASSIUM SERPL-SCNC: 3.8 MMOL/L (ref 3.5–5.1)
RBC # BLD AUTO: 3.41 M/MM3 (ref 4.1–5.3)
SODIUM SERPL-SCNC: 141 MMOL/L (ref 136–145)
WBC # BLD AUTO: 8 K/MM3 (ref 4.8–10.8)

## 2022-04-18 NOTE — NUR
Patient awake and called for assist to commode to have bm. States she's been
sleeping really good tonight. Alert and oriented x 4. Up to BSC via sit to
stand lift and 2 assist. Incontinent of large amount of urine and voids and
has small soft formed brown bm. Good caleb-care done and groin creases/panus
cleansed and patted dry and nystatin powder applied. Max assist to rest back
in bed. Wt decreased from 213.3 to 210 lbs.

## 2022-04-18 NOTE — NUR
PATIENT UP TO CHAIR FOR BREAKFAST. TOOK MEDICICATIONS WITHOUT DIFFICULTY.
TEARFUL THIS MORNING WHILE DISCUSSING HER LIMITED MOBILITY. PLAN TO TREAT PAIN
BEFORE NEXT THERAPY SESSION. CALL LIGHT WITHIN REACH, CHAIR ALARM ON.

## 2022-04-18 NOTE — NUR
Report received from Stephanie GARCIA. Patient resting in bed. Oxygen in place at
1L/NC. Assessment completed. Denies pain at rest. Oxycodone 5 MG taken at this
time for pain control/prevention. No nausea noted. Takes meds with applesauce
to protect GI. Mic held, has had loose BM's. Blood sugar 229. 6 units of
Humalog given in MARYA per SS. Levemier 8 Units given per scheduled dose. Powder
to below breasts and groin folds, areas improving. Denies wants or needs at
this time. CNA's report patient was a 2:1 pivot transfer from recliner to BSC
then sit-stand lift from BSC to bed. Resting, bed alarm on. Call light in
reach.

## 2022-04-18 NOTE — NUR
PATIENT UP TO BEDSIDE COMMODE. ABLE TO STAND AND PIVOT WITH DIFFICULTY AND
PAIN. ASSISTED TO RECLINER FOR SUPPER. MUSCLE RUB APPLIED TO R HIP, AVOIDING
INCISIONS. ZOFRAN GIVEN FOR NAUSEA. INSULIN HELD DUE TO LOW BLOOD SUGAR AND
DIMINISHED APPETITE, DISCUSSED AND APPROVED BY PROVIDER.

## 2022-04-19 VITALS — DIASTOLIC BLOOD PRESSURE: 57 MMHG | SYSTOLIC BLOOD PRESSURE: 118 MMHG

## 2022-04-19 VITALS — DIASTOLIC BLOOD PRESSURE: 54 MMHG | SYSTOLIC BLOOD PRESSURE: 152 MMHG

## 2022-04-19 NOTE — NUR
Report received from Pauline GARCIA. Patient resting supine in bed. A/O x4. Denies
pain at rest. Oxygen in place at 1L/NC. Assessment completed. Staff continues
to utilize Sit-stand lift for transfers. Bed alarm on. Call light in reach.

## 2022-04-19 NOTE — NUR
Spoke with Rima about her healing process and that medically she was not able
to participate in therapy as she would like.  She has been here 9 days and is
making progress.  Went over what Medicare pays 1-20 days 100% coverage 
80-20% coverage.  Rima's fluid retention is improving but will likely take
another week or so before she will feel the improvement of fluid loss.
Called Daughter Kimberlee and explained the above.

## 2022-04-20 VITALS — SYSTOLIC BLOOD PRESSURE: 119 MMHG | DIASTOLIC BLOOD PRESSURE: 62 MMHG

## 2022-04-20 VITALS — DIASTOLIC BLOOD PRESSURE: 55 MMHG | SYSTOLIC BLOOD PRESSURE: 151 MMHG

## 2022-04-20 LAB
CALCIUM SERPL-MCNC: 8.6 MG/DL (ref 8.3–10.5)
CO2 SERPL-SCNC: 34 MMOL/L (ref 23–31)
GLUCOSE SERPL-MCNC: 129 MG/DL (ref 65–105)
POTASSIUM SERPL-SCNC: 3.5 MMOL/L (ref 3.5–5.1)
SODIUM SERPL-SCNC: 144 MMOL/L (ref 136–145)

## 2022-04-20 NOTE — NUR
Patient sitting up in recliner with cam boot on LLE. Denies pain at rest but
reports pain with minimal touch/movement. HS meds along with xanax and tylenol
for rest and pain reviewed and taken without problems. Oriented to self, ,
in [place] and that it's evening. Disoriented to place, month and year. CNAs
assisted patient to bed via full lift.

## 2022-04-20 NOTE — NUR
Up to BSC with MAX assist of 2 and sit-stand lift. Needs much encouragment to
help move self in bed, sit up etc. Voids and has small BM and is incontinent
of bladder. Assisted back to bed. Pur-wick placed. Positioned with MAX assist
of 2 in bed. Bed alarm on. Call light in reach.

## 2022-04-20 NOTE — NUR
Patient rests in bed awake. HS meds reviewed and given. Accu check 251 and
patient refused snack reported to Marian SZYMANSKI. States she had a glucerna with
supper and not hungry. Does take package of saltine crackers with pain med and
potassium pill. New order received to hold tonights SSI and just give
scheduled levemir-given. States not having pain "if I don't move" and
roxycodone 5 mg given.

## 2022-04-21 VITALS — DIASTOLIC BLOOD PRESSURE: 51 MMHG | SYSTOLIC BLOOD PRESSURE: 145 MMHG

## 2022-04-21 VITALS — SYSTOLIC BLOOD PRESSURE: 144 MMHG | DIASTOLIC BLOOD PRESSURE: 80 MMHG

## 2022-04-21 NOTE — NUR
spoke with rachael today and she states that last night she didn't sleep well due
to difficulty breathing.  At this time she feels a lot better.  She likes her
bigger room.

## 2022-04-21 NOTE — NUR
Pt showered, feet soaked after unna boots removed. Used 2 pkg of unna per foot
to wrap after soaking and lightly scrubbing both feet. Finished bandages with
dawnan (1 roll per leg). Pt resting in chair with feet elevated in recliner,
pillow under knees and heels floating. Pt denies any pain but states that she
is "worn out" from therapy and shower. Chair alarm activated & call light
within reach.

## 2022-04-21 NOTE — NUR
Patient awake and stating her foot hurts. Tramadol given. Alert to person, 
but disoriented to time, month, year and situation. Patient had taken out
purewick catheter and incontinent of urine. Changed and repositioned.

## 2022-04-21 NOTE — NUR
Patient sitting up in recliner and just finished brushing teeth. Alert and
oriented x 4. Denies pain or shortness of breath. States diuretics have helped
her breathing. HS meds reviewed along with SSI and akanksha and given. Takes
snack of chocolate glucerna. CNAs assisted patient to bed via sit to stand
lift , good caleb-care done and powder applied. No redness noted. Pure wick
catheter applied to suction.

## 2022-04-21 NOTE — NUR
Patient reports she didn't rest well this noc. "feel like a tight band of
fluid"around upper waist/epigastric area. Demadex given at this time. Wt 95 KG
on 4/20 and 94.6 this am. Offered tylenol for pain prevention and given. Right
hip "only hurts when I move".

## 2022-04-21 NOTE — NUR
A&O x4.  pt c/o nausea.  Pt denies any pain except when moving right hip. Pt
denies SOB or difficulty breathing and states "I feel better with breathing
since taking the diuretics." Pt states that she is looking forward to her
daughter visiting today and that she will be bringing her a chicken leg to try
to help stimulate her appetite as she misses food from home. Pt is asking for
toenails to be trimmed. Fourth toe on left food has dried blood around the
toe. Pt denies any known trauma to the toe. Pt has call light next to her,
chair alarm on, and is resting with legs elevated in chair.  Pillows under
legs and back.

## 2022-04-22 VITALS — SYSTOLIC BLOOD PRESSURE: 144 MMHG | DIASTOLIC BLOOD PRESSURE: 54 MMHG

## 2022-04-22 VITALS — SYSTOLIC BLOOD PRESSURE: 156 MMHG | DIASTOLIC BLOOD PRESSURE: 66 MMHG

## 2022-04-22 LAB
BASOPHILS # BLD: 0.08 K/MM3 (ref 0.02–0.1)
CALCIUM SERPL-MCNC: 8.9 MG/DL (ref 8.3–10.5)
CO2 SERPL-SCNC: 34 MMOL/L (ref 23–31)
EOSINOPHIL # BLD: 0.13 K/MM3 (ref 0.04–0.4)
EOSINOPHIL NFR BLD: 2.1 % (ref 1–5)
ERYTHROCYTE [DISTWIDTH] IN BLOOD BY AUTOMATED COUNT: 16 % (ref 11.5–14.5)
GLUCOSE SERPL-MCNC: 171 MG/DL (ref 65–105)
HCT VFR BLD AUTO: 31.4 % (ref 37–47)
HGB BLD-MCNC: 9.8 G/DL (ref 12.5–16)
LYMPHOCYTES # BLD: 0.6 K/MM3 (ref 1.5–4)
MCH RBC QN AUTO: 31 PG (ref 27–31)
MCHC RBC AUTO-ENTMCNC: 31 G/DL (ref 33–37)
MCV RBC AUTO: 100 FL (ref 78–100)
MONOCYTES # BLD: 0.71 K/MM3 (ref 0.2–0.8)
NEUTROPHILS # BLD: 4.54 K/MM3 (ref 1.4–6.5)
PLATELET # BLD AUTO: 247 K/MM3 (ref 130–400)
PMV BLD AUTO: 11.1 FL (ref 7.4–10.4)
POTASSIUM SERPL-SCNC: 4.2 MMOL/L (ref 3.5–5.1)
RBC # BLD AUTO: 3.15 M/MM3 (ref 4.1–5.3)
SODIUM SERPL-SCNC: 141 MMOL/L (ref 136–145)
WBC # BLD AUTO: 6.1 K/MM3 (ref 4.8–10.8)

## 2022-04-22 NOTE — NUR
Patient resting in bed awake. States just woke up and right foot burning 8/10
pain. Roxycodone given with crackers. States she has been sleeping for several
hours. "I was wore out".

## 2022-04-22 NOTE — NUR
Patient called for assist and states my left foot is burning. Coban to top of
foot stretched and loosened and pulled dressing down so not over 3rd toe thats
toenail had bled previously. Patient states she dosn't remember stubbing her
toe on anything. Patient states "I have no pain now". Tylenol given.

## 2022-04-22 NOTE — NUR
Pt a/o x4. pt c/o pain rdiating from right hip down to knee. She states that
she didn't sleep well due to pain on top of left foot above broken toenail.
Pt reports pain level @ 5/10; denies pain at top of left foot or in toenail.
Cap refill less than 1 second in BLE. Pt states that she "is tired of not
being able to walk" and verbalizes being discouraged with progress. This nurse
reminded the pt of the progress she made with PT/OT yesterday and encouraged
her to keep working and progress will continue.  Pt nodded agreement and
vergalized she will keep truing. Pt in bedside chair, BLE elevated, heels
floated, call light within reach and chair alarm on.

## 2022-04-22 NOTE — NUR
PT & OT report to this nurse that pt feeling nausea and unable to really
complete therapy this afternoon. Therapists state that pt was dropping to
88-90% SAO2 during her session this morning.  Pt was given 2 L of O2 via nasal
canula @ 1100. Therapists assisted pt into bed.
This nurse gave PRN medication for nausea. Pt denied having nausea all day
with this nurse.  Prior to therapy, this nurse asked the pt if experiencing
nausea or pain and both were denied.  Pt told therapists that "nausea began
before therapy".  This nurse asked the pt when it started and pt stated, "I
don't know when it started."
This nurse offered pt cool wash cloth for neck to help with nausea.  Pt
refused. This nurse offered to help pt dim lights in the room to help pt rest,
pt verbally agreed and turned down lights with callbutton. This nurse verified
that pt was comfortable in bed, pt states 0/10 pain, HOB and knees elevated in
bed, call light in place, bedside tray within reach.

## 2022-04-22 NOTE — NUR
While giving meds, this nurse watched the pt swallow.  This nurse questioned
whether the pills were getting stuck in her throat today or if they were going
down easier.  Pt pointed at her head and stated, "It's all in my head." Pt
took her potassium pill and this nurse witnessed pt chewing. This nurse asked
the pt if she was chewing the pill.  Pt shook her head to affirmate that the
pills were being chewed.  This  nurse advised the pt that potassium pills
shouldn't be chewed.  This nurse advised the pt that we could put the pill in
a small amount of juice and allow it to dissolve and she could just drink the
juice.  Pt stated "yes, that would be great."
Pt left in chair, call light within reach, BLE elevated, heels floating, chair
alarm activated.

## 2022-04-22 NOTE — NUR
@1426 this  nurse assessed pts pain. Pt denied having pain. @1518 this nurse
went to reassess pt's nausea after zofran at 1425.  pt states "It has helped
just a little bit." Pt then states, "I have a pain in my right hip." This
nurse suggested that we reposition in bed.  Pt refused stating that movement
makes the pain worse. Pt rates pain 5/10. Pt states she "just wants to rest".
Left pt resting in bed, heels floating, HOB slightly raised, knees slightly
raised, and call light within reach.

## 2022-04-22 NOTE — NUR
Pt resting in bedside chair, BLE elevated, heels floating after PT.  Pt states
that she "took a few steps and came right back into the room".  This nurse
advised the pt that she needs to look at what she is accomplishing and not
what she thinks she should be able to.  Pt verbalized agreement. Pt denies any
pain or discomfort. This nurse was advised that the pt was moved to 2 L of 02
via nasal canula @ 1100 following PT. Pt denies any SOB. Pt educated on using
call light to report pain or discomfort.  Pt verbalized understanding. Pt left
in bedside chair, BLE elevated, heels floating, call light within reach,
activated chair alarm.

## 2022-04-22 NOTE — NUR
Pt in chair with feet elevated, heels floating. Pt states no pain 0/10 scale
after PRN medication. Pt unna boot koband wrap coming undone.  Wrapped ACE
bandage over unna & koband to secure. Placed non-slip socks over the top to
prevent slips. Left pt with CNA

## 2022-04-22 NOTE — NUR
Report received from Lilliana RN nursing student. Patient observed resting
supine in bed with oxygen in place at 2L/NC. Patient is A/O x4. Denies pain,
denies dizziness or nausea. Blood sugar 286. Levemier 8 units given along with
4 Units of Humalog SS. PRN Oxycodone given at this time for pain
control/prevention. K+ given dissolved in OJ per patient request. Encouraged
patient to eat some crackers with medications to prevent GI upset. Assessment
completed. Denies wants or needs. Bed alarm on. Call light in reach.

## 2022-04-22 NOTE — NUR
Rings call light and states top of L foot hurting. Wrap pulled up to loosen.
Ice pack applied. Has analgesic with HS medications.

## 2022-04-23 VITALS — DIASTOLIC BLOOD PRESSURE: 62 MMHG | SYSTOLIC BLOOD PRESSURE: 154 MMHG

## 2022-04-23 VITALS — DIASTOLIC BLOOD PRESSURE: 66 MMHG | SYSTOLIC BLOOD PRESSURE: 163 MMHG

## 2022-04-23 NOTE — NUR
Pt resting in bed. A/O x 4. Denies pain when not moving. Puriwick in place -
clear yellow urine noted. 02 at 2 L/NC. Lungs clear. Unna boots covered w/
coban in place to bilateral lower extremities. Incisions to right leg well
approixmated with two steri strips loose - no drainage or redness.  Noted to
have some swelling above unna boots bilateral lower legs. Remains on fluid
restriction.

## 2022-04-23 NOTE — NUR
Report received from Kimberlee GARCIA. Patient resting in bed with eyes closed.
Oxygen in place at 2L/NC. Respirations even and non-labored. Purwick in place
with continuous suction. Bed alarm on. Call light in reach.

## 2022-04-23 NOTE — NUR
Oxycodone 5 mg given per request for burning in left foot 4/10. Encouraged to
reposition onto side and off bottom but pt refuses at this time. Call light
in reach and bed alarm on.

## 2022-04-23 NOTE — NUR
Up to BSC via sit-stand lift. Patient is a MAX 2 assist from lying to sitting
position. States she just "can't do it" or get her legs to move.

## 2022-04-23 NOTE — NUR
Patient ambulated from chair to commode, then to the bed. Tolerated well with
2 staff assist and platform walker. Tearful x2 this shift. Verbalizes
frustration with slow progress. Denies pain when sitting still.

## 2022-04-23 NOTE — NUR
Patient alert and oriented. Up to chair for breakfast via sit-to-stand lift.
No complaints of pain when resting. Call light in reach, chair alarm on.

## 2022-04-24 VITALS — SYSTOLIC BLOOD PRESSURE: 149 MMHG | DIASTOLIC BLOOD PRESSURE: 65 MMHG

## 2022-04-24 VITALS — DIASTOLIC BLOOD PRESSURE: 68 MMHG | SYSTOLIC BLOOD PRESSURE: 163 MMHG

## 2022-04-24 NOTE — NUR
Up to BSC via 2 assist with Sit-stand lift. Voids 600 ML of clear yellow
urine. Has Small Soft formed BM. No SOA noted. No grimacing or signs of pain.
No verbalization of dizziness. Happy that daily weight is now below 200#.

## 2022-04-24 NOTE — NUR
Patient used sit to stand with transfer from chair to commode. Patient reports
she refused ambulating with walker and exercises throughout the day. She
states, "I just take one step forward and 2 steps back." She reports feeling
weaker today and not able to participate. Patient encouraged multiple times to
use walker and complete exercises. Swallowed pills whole with water. Dependent
with set up of HS cares. Purewick in place per patient request. Patient
states, "I don't want to be up to the bathroom all night." , insulin
given per orders. Bed in lowest and locked position. Call light within reach.

## 2022-04-24 NOTE — NUR
Patient A&Ox4. Up to chair for meals. Declined walking to chair today with
staff. Stated several times that "it isn't a good day". When questioned
further, states that she takes "one step forward and two steps back". Staff
provided encouragement. Patient still declined walking a few feet from the bed
to the chair. Denied any pain when resting. Pain 5/10 in hip when tranferring.
Call light in reach. Chair alarm on.

## 2022-04-25 VITALS — SYSTOLIC BLOOD PRESSURE: 166 MMHG | DIASTOLIC BLOOD PRESSURE: 66 MMHG

## 2022-04-25 VITALS — SYSTOLIC BLOOD PRESSURE: 102 MMHG | DIASTOLIC BLOOD PRESSURE: 67 MMHG

## 2022-04-25 NOTE — NUR
Report received from Kayla GARCIA. Patient resting in bed with oxygen in place at
1L/NC. Was just up to BS with 1-2 assist and platform walker. Had large BM.
Denies pain. Oxycodone taken for pain control. Blood sugar 345. 6 Units of SS
Novolog and 8 Units of Levemier given in [facility]. Assessment completed. Denies
wants or needs. Bed alarm on. Call light in reach.

## 2022-04-25 NOTE — NUR
Assessment charted. Pt up to shower with staff, removed UNNA boot and replaced
after shower, CNAs removed most of sloughing dead skin in shower and per them
the legs are improving bilaterally from the UNNA boots. Replaced with UNNA
boot and loose COBAN. Pt toleraetd well. Able to walk to bathroom with 2PAX
and walker with gait belt. Pt up to have BM. Tolerating well. Asked about
podiatry and they no longer see patients in the hospital so will need OP
follow up upon discharge. Will continue to monitor.

## 2022-04-25 NOTE — NUR
Pt resting in chair after supper,doing well, did not want to take any pain
medication all day, when asked stated that pain was zero except when moving.
encouraged pt to use walker all day, pt did ask each time for sit-to-stand
lift but reminded her that we need to continue to use the walker as often as
possible to encourage progress.  When pt on commode, pt stated "i'm done" and
I handed her a wipe, she responded that she "can't do that herself" and
reminded her that if she wants to continue towards independence that she needs
to try on her own and we can assist with anything she is unable to do but it
is important for her to make the initial attempt. Pt agreeable to this plan
but did require encouragment to initiate.  Denies needs, resting with 2L NC.
Will give report to nightshift nurse who will resume care.

## 2022-04-26 VITALS — DIASTOLIC BLOOD PRESSURE: 63 MMHG | SYSTOLIC BLOOD PRESSURE: 121 MMHG

## 2022-04-26 VITALS — SYSTOLIC BLOOD PRESSURE: 151 MMHG | DIASTOLIC BLOOD PRESSURE: 67 MMHG

## 2022-04-26 LAB
APPEARANCE UR: (no result)
BILIRUB UR QL STRIP.AUTO: NEGATIVE
CALCIUM SERPL-MCNC: 8.9 MG/DL (ref 8.3–10.5)
CO2 SERPL-SCNC: 33 MMOL/L (ref 23–31)
COLOR UR AUTO: YELLOW
ERYTHROCYTE [DISTWIDTH] IN BLOOD BY AUTOMATED COUNT: 15.6 % (ref 11.5–14.5)
GLUCOSE SERPL-MCNC: 229 MG/DL (ref 65–105)
GLUCOSE UR QL STRIP.AUTO: NEGATIVE
HCT VFR BLD AUTO: 36.3 % (ref 37–47)
HGB BLD-MCNC: 11.4 G/DL (ref 12.5–16)
KETONES UR QL STRIP.AUTO: NEGATIVE
LEUKOCYTE ESTERASE UR QL STRIP: NEGATIVE
LYMPHOCYTES NFR BLD MANUAL: 11 % (ref 20–51)
MCH RBC QN AUTO: 31 PG (ref 27–31)
MCHC RBC AUTO-ENTMCNC: 31 G/DL (ref 33–37)
MCV RBC AUTO: 99 FL (ref 78–100)
MONOCYTES NFR BLD: 8 % (ref 3–10)
NEUTS SEG NFR BLD MANUAL: 77 % (ref 42–75)
NITRITE UR QL STRIP: NEGATIVE
PH UR STRIP.AUTO: 6.5 [PH] (ref 5–8)
PLATELET # BLD AUTO: 254 K/MM3 (ref 130–400)
PMV BLD AUTO: 11.4 FL (ref 7.4–10.4)
POTASSIUM SERPL-SCNC: 3.4 MMOL/L (ref 3.5–5.1)
PROT ?TM UR-MCNC: NEGATIVE MG/DL
RBC # BLD AUTO: 3.68 M/MM3 (ref 4.1–5.3)
RBC UR QL AUTO: NEGATIVE
SODIUM SERPL-SCNC: 141 MMOL/L (ref 136–145)
SP GR UR STRIP.AUTO: 1.01 (ref 1–1.03)
UROBILINOGEN UR-MCNC: NORMAL MG/DL
WBC # BLD AUTO: 4.7 K/MM3 (ref 4.8–10.8)
WBC #/AREA URNS HPF: (no result) /HPF (ref 0–3)

## 2022-04-26 NOTE — NUR
APAP GIVEN PER THERAPY REQUEST. PATIENT AWARE AND WILL NOTIFY NURSING STAFF OF
ANY COMPLAINTS OF PAIN.

## 2022-04-26 NOTE — NUR
Resting quietly with eyes closed. No signs of pain or distress. Oxygen in
place at 1L/NC. Bed alarm on. Call light in reach. normal shape/ROM intact normal shape/ROM intact/strength intact

## 2022-04-26 NOTE — NUR
Patient rested well all night. Up earlier in shift around 2200 and had large
BM. Has not rang to get OOB to use BSC rest of shift. AM medications taken
this AM. Denies wants, pain or needs at this time.

## 2022-04-26 NOTE — NUR
PATIENT REMIANS PLEASENT WITH CARE THROUGHT OUT THE DAY. CONTINUES TO DENY
PAIN. STATES SHE HAS "ENJOYED WATCHING THE BIRDS BUILD THEIR NEST." PATIENT
HAS AMBULATED TO BATHROOM THROUGHOUT THE DAY WITH MINIMAL DIFFICULTY AND NO
COMPLAINTS OF PAIN OR NAUSEA. PATIENT CURRENTLY RESTING IN CHAIR. CHAIR ALARM
ON, CALL LIGHT WITHIN REACH.

## 2022-04-26 NOTE — NUR
PATIENT PLEASENT AND COOPERATIVE WITH CARES. UP TO CHAIR FOR FOR MORNING MEAL.
A&OX4, DENIES PAIN AT THIS TIME. PATIENT STATES RESTED WELL AND IS READY FOR
THERAPY TODAY. PATIENT USING WALKER WITH 2X ASSIT. OFFERS NO OTHER NEEDS OR
COMPLAINTS AT THIS TIME. CHAIR ALARM ON, CALL LIGHT WITHIN REACH.

## 2022-04-26 NOTE — NUR
Report received from Seema WHITLOCK. Resting in bed. A/O x4. Spot check SPO2 98%
on RA. Denies pain but Tylenol given for c/o's "legs jumping" occasionally.
Blood sugar 260. 4 Units SS given and scheduled 8 Units of Levemier.
Assessment completed. Denies wants or needs. Bed alarm on. Call light in
reach.

## 2022-04-27 VITALS — DIASTOLIC BLOOD PRESSURE: 69 MMHG | SYSTOLIC BLOOD PRESSURE: 169 MMHG

## 2022-04-27 VITALS — DIASTOLIC BLOOD PRESSURE: 51 MMHG | SYSTOLIC BLOOD PRESSURE: 132 MMHG

## 2022-04-27 NOTE — NUR
Report received from Pauline GARCIA. Up in recliner watching TV. A/O x4. States
having some intermittent cramping of legs from walking. Distressed look on her
face. States "I just know I am going to have to go into the bathroom and I
don't know if I can walk in there". Encouragement and positive reinforcement
given. Discussed increased activity will help strenghthen her endurance.
Assessment completed. Denies questions, wants or needs at this time.

## 2022-04-27 NOTE — NUR
AM medication taken. States did not sleep well. States nose is "stuffy". Uses
Vicks vapo rub at bedside.

## 2022-04-28 VITALS — DIASTOLIC BLOOD PRESSURE: 68 MMHG | SYSTOLIC BLOOD PRESSURE: 172 MMHG

## 2022-04-28 VITALS — SYSTOLIC BLOOD PRESSURE: 179 MMHG | DIASTOLIC BLOOD PRESSURE: 70 MMHG

## 2022-04-28 NOTE — NUR
Pt a/o x4. Pt states that she had "2 very large bowel movements last night."
Pt states that she is excited to be below 200 pounds. Pt states that all of
her weight loss is not just due to the diuretic but also the bowel movements.
This nurse advised pt that it could be a combination between diuretic and
bowel movment that this nurse has noticed that her legs have less edema. This
nurse advised pt that the more she gets up and moves the more her GI system
will move and the less that she moves the less the GI system wants to move. Pt
voiced understanding. Pt denies any pain when she is not moving, but c/o pain
in rt hip upon movement. This nurse encouraged pt to continue moving through
the pain to help heal. Marian, hospitalist, advised that pt could choose
between Unna boots and ACE wrap today. Pt advised this nurse to just wrap with
ACE bandages after her shower.  Wrapped BLE with 1 ACE wrap each. Pt states
that it "feels good." Pt resting in bed, head elevated 30 degrees, pillow
under knees to float heels, call light within reach, bed alarm activated. Pt was notified and appt scheduled.

## 2022-04-28 NOTE — NUR
Report received from Lilliana RN student. Patient sitting up in recliner with
legs elevated. Ace wraps in place to BLE. A/O x4. States she had a "pretty
good day". Denies pain, SOA or nausea at rest. States she still gets "dizzy"
and sometimes nauseated with standing. Would like Tylenol at HS to help her
sleep. Assessment completed. Denies wants or needs at this time. Chair alarm
on. Call light in reach.

## 2022-04-29 VITALS — SYSTOLIC BLOOD PRESSURE: 166 MMHG | DIASTOLIC BLOOD PRESSURE: 72 MMHG

## 2022-04-29 VITALS — DIASTOLIC BLOOD PRESSURE: 62 MMHG | SYSTOLIC BLOOD PRESSURE: 152 MMHG

## 2022-04-29 VITALS — SYSTOLIC BLOOD PRESSURE: 176 MMHG | DIASTOLIC BLOOD PRESSURE: 67 MMHG

## 2022-04-29 NOTE — NUR
Pt a/o x4. Pt states that she slept well. Pt denies any n/v. Left pt sitting
in bedside chair with CNA soaking feet.

## 2022-04-29 NOTE — NUR
Dat watch turned to warning and patient assisted via wheelchair to
operating room of hospital per protocol.

## 2022-04-29 NOTE — NUR
[place]ado warning ended and cnas assisted patient via wheelchair back to room,
ambulates slowly with platform walker to the bathroom, then assisted to bed.
Purewick catheter applied. HS insulin reviewed and given and tylenol given
for legs "jumping". Declined snack.

## 2022-04-29 NOTE — NUR
Patient resting in bed awake. Levemier reviewed and given. Tylenol offered and
given for intermittent right hip pain. Gold bond lotion applied to feet and
legs. Patient has pure wick external catheter on through the night.

## 2022-04-29 NOTE — NUR
Pt a/o x4. Pt unhappy with meals. She states she will request canned soup
from her daughter to be brought for her use. This nurse offered to make canned
soup for pt, pt refused. Pt denies pain. Pt states that she only has pain when
she has to move her hip. Pt is excited to get better with movement so that she
can return home. Pt's daughter visited today and verbalized that she witnessed
good progress from last week to today. Pt agreed with daughter. Pt resting in
chair, feet elevated, call light within reach.

## 2022-04-30 VITALS — DIASTOLIC BLOOD PRESSURE: 64 MMHG | SYSTOLIC BLOOD PRESSURE: 156 MMHG

## 2022-04-30 VITALS — DIASTOLIC BLOOD PRESSURE: 65 MMHG | SYSTOLIC BLOOD PRESSURE: 153 MMHG

## 2022-04-30 VITALS — DIASTOLIC BLOOD PRESSURE: 48 MMHG | SYSTOLIC BLOOD PRESSURE: 116 MMHG

## 2022-04-30 NOTE — NUR
Patient rests in bed and purewick catheter in place. States she has right hip
achy pain with movement 4/10 and tylenol reviewed and given. Gold bond lotion
applied to legs and feet.

## 2022-04-30 NOTE — NUR
Patient states she did rest well this noc. "Just woke up at 0230" thinking she
needed to have bm but no bm.

## 2022-05-01 VITALS — SYSTOLIC BLOOD PRESSURE: 115 MMHG | DIASTOLIC BLOOD PRESSURE: 81 MMHG

## 2022-05-01 VITALS — DIASTOLIC BLOOD PRESSURE: 30 MMHG | SYSTOLIC BLOOD PRESSURE: 91 MMHG

## 2022-05-01 VITALS — DIASTOLIC BLOOD PRESSURE: 70 MMHG | SYSTOLIC BLOOD PRESSURE: 161 MMHG

## 2022-05-01 VITALS — DIASTOLIC BLOOD PRESSURE: 71 MMHG | SYSTOLIC BLOOD PRESSURE: 136 MMHG

## 2022-05-01 LAB
CALCIUM SERPL-MCNC: 9.5 MG/DL (ref 8.3–10.5)
CO2 SERPL-SCNC: 25 MMOL/L (ref 23–31)
GLUCOSE SERPL-MCNC: 274 MG/DL (ref 65–105)
POTASSIUM SERPL-SCNC: 3.8 MMOL/L (ref 3.5–5.1)
SODIUM SERPL-SCNC: 140 MMOL/L (ref 136–145)

## 2022-05-01 NOTE — NUR
Patient resting in bed. Tylenol given for intermittent right hip pain and for
restless legs. Patient pleasant and alert and oriented x4. Ace wraps to legs
removed and gold bond lotion applied. BLE elevated on pillow.

## 2022-05-01 NOTE — NUR
This nurse responded to patient's call light. Upon arrival to room patient
sitting up in chair with her head in her hands. She states, "I feel so dizzy,
I'm going to be sick." , VS BP 91/30, P96, RR18, PS02 97%, Findings
reported to Dr. Betancourt. Patient sipping ice water and resting in chair. She
reports she is feeling better when she sits up and drinks water. See new
orders.

## 2022-05-01 NOTE — NUR
Bolus complete. Continue remaining 500ml of NS at 125 ml/hr, VORB per Dr. Betancourt. Patient tolerating treatment well. Resting in chair. Chair in locked
position. Call light within reach.

## 2022-05-01 NOTE — NUR
Patient resting in chair eating breakfast. A&Ox4, RA, no c/o pain or
discomfort. Fluids infusing per order. Patient tolerating well. LS CTA.
Reports she is feeling better. Swallowed pills whole with jello and water.
Requesting more ice water. Reports feeling very thirsty. Chair in locked
position. Call light within reach.

## 2022-05-01 NOTE — NUR
Report received from Ira GARCIA. CNA assists Patient to the bathroom and then
to bed. Pure wick catheter applied.

## 2022-05-01 NOTE — NUR
Patient awakened for vitals. 1000mls of urine emptied from pure wick catheter
canister. States she slept well this noc.

## 2022-05-02 VITALS — DIASTOLIC BLOOD PRESSURE: 65 MMHG | SYSTOLIC BLOOD PRESSURE: 163 MMHG

## 2022-05-02 VITALS — DIASTOLIC BLOOD PRESSURE: 51 MMHG | SYSTOLIC BLOOD PRESSURE: 128 MMHG

## 2022-05-02 LAB
BASOPHILS # BLD: 0.05 K/MM3 (ref 0.02–0.1)
CALCIUM SERPL-MCNC: 8.8 MG/DL (ref 8.3–10.5)
CO2 SERPL-SCNC: 23 MMOL/L (ref 23–31)
EOSINOPHIL # BLD: 0.15 K/MM3 (ref 0.04–0.4)
EOSINOPHIL NFR BLD: 3.9 % (ref 1–5)
ERYTHROCYTE [DISTWIDTH] IN BLOOD BY AUTOMATED COUNT: 15.3 % (ref 11.5–14.5)
GLUCOSE SERPL-MCNC: 113 MG/DL (ref 65–105)
HCT VFR BLD AUTO: 39.2 % (ref 37–47)
HGB BLD-MCNC: 12.5 G/DL (ref 12.5–16)
LYMPHOCYTES # BLD: 0.78 K/MM3 (ref 1.5–4)
MCH RBC QN AUTO: 31 PG (ref 27–31)
MCHC RBC AUTO-ENTMCNC: 32 G/DL (ref 33–37)
MCV RBC AUTO: 98 FL (ref 78–100)
MONOCYTES # BLD: 0.67 K/MM3 (ref 0.2–0.8)
NEUTROPHILS # BLD: 2.22 K/MM3 (ref 1.4–6.5)
PLATELET # BLD AUTO: 246 K/MM3 (ref 130–400)
PMV BLD AUTO: 11.5 FL (ref 7.4–10.4)
POTASSIUM SERPL-SCNC: 4.3 MMOL/L (ref 3.5–5.1)
RBC # BLD AUTO: 4.02 M/MM3 (ref 4.1–5.3)
SODIUM SERPL-SCNC: 141 MMOL/L (ref 136–145)
WBC # BLD AUTO: 3.9 K/MM3 (ref 4.8–10.8)

## 2022-05-02 NOTE — NUR
Report received from Kayla GARCIA. Patient resting in bed. States she is "tired
tonight."  A/O x4. Denies pain but would like Tylenol with HS medications to
help facilitate sleep. Ace wraps removed at this time. No edema noted to BLE.
Scaling improved but still has thick dry skin. INT patent to LFA. Assessment
completed. Denies wants or needs. CNA in to apply pur-wik.

## 2022-05-02 NOTE — NUR
Patient awakened for am vitals and med. States she slept well this noc.
Continent of urine and pure wick intact.

## 2022-05-02 NOTE — NUR
Pt has done well today, resting in chair at side of bed, taking PO well, did
not require any pain medications over shift. Up with therapy and staff, moving
well. Denies needs, will give report to nighthshift nruse who will resume
care.

## 2022-05-02 NOTE — NUR
Assessment charted. Pt in bed resting after shower this am. Up walking doing
well, legs are improviing and less redness present. Denies maxime, INT to LFA.
Will continue to monitor.

## 2022-05-03 VITALS — SYSTOLIC BLOOD PRESSURE: 140 MMHG | DIASTOLIC BLOOD PRESSURE: 53 MMHG

## 2022-05-03 VITALS — DIASTOLIC BLOOD PRESSURE: 70 MMHG | SYSTOLIC BLOOD PRESSURE: 183 MMHG

## 2022-05-03 VITALS — DIASTOLIC BLOOD PRESSURE: 54 MMHG | SYSTOLIC BLOOD PRESSURE: 107 MMHG

## 2022-05-03 NOTE — NUR
Patient resting in chair visiting with family on telephone. A&Ox4, RA, no c/o
pain or discomfort. Reports pain from this AM, "feels so much better." She
reports feeling sore this AM from all the walking the last few day. Pleasant
with staff. Swallowed pills whole with water and jello. Lotion and ace wraps
applied to BLE. Assisted with set up for AM cares. Had a shower 05/02/22. Last
BM 05/02/22. Chair in locked position. Call light within reach.

## 2022-05-03 NOTE — NUR
Provider notified of patient requesting Oxycodone for sleep, has denied pain
to this nurse. Order received for Melatonin at this time. Patient educated on
need to try Melatonin and Tylenol for sleep vs Oxycodone. Patient states "I
just need the pain to go away". This nurse advised that patient denied pain
and patient states "it's not really a pain, my legs get "jumpy" and
"uncomfortable". This nurse also advised patient that she doesn't call in the
night to report pain or problems sleeping and that I don't know this if she
doesn't report it. "I just don't want to bother you". Educated on letting
staff know if having pain or further trouble with sleep. Educated that
provider will be getting labs in the AM to check electrolytes and that if she
is having bad pain the Oxycodone is available but should only be taken for
severe pain and not for sleep as this can delay her discharge to home. Patient
verbalizes that she understands.

## 2022-05-03 NOTE — NUR
Report received from Ira GARCIA. Patient resting in bed with CNA in room
providing cares. A/O x4. Denies pain at rest but states she "wants a pain
pill" with HS medications. This nurse clarifys with patient that she wants
Oxycodone vs Tylenol and patient states yes the "pain pill, not Tylenol".
States she didn't sleep well last night and that she thinks it will help more
then the Tylenol. Assessment completed. ACE wraps removed from LE for the
night and Pur-wik applied. Bed alarm on. Call light in reach.

## 2022-05-03 NOTE — NUR
Rested well without complaint. Up this AM to BR. States she didn't sleep well
and worried that she hasn't had a BM yet. Report to Ira GARCIA.

## 2022-05-03 NOTE — NUR
CALLED DAUGHTER ALOK AND GAVE HER THE UPDATE ON TI'S PROGRESS AND WILL KEEP
HER AND REASSESS NEXT WEEK.

## 2022-05-04 VITALS — SYSTOLIC BLOOD PRESSURE: 159 MMHG | DIASTOLIC BLOOD PRESSURE: 66 MMHG

## 2022-05-04 VITALS — DIASTOLIC BLOOD PRESSURE: 53 MMHG | SYSTOLIC BLOOD PRESSURE: 117 MMHG

## 2022-05-04 VITALS — DIASTOLIC BLOOD PRESSURE: 52 MMHG | SYSTOLIC BLOOD PRESSURE: 110 MMHG

## 2022-05-04 VITALS — SYSTOLIC BLOOD PRESSURE: 81 MMHG | DIASTOLIC BLOOD PRESSURE: 34 MMHG

## 2022-05-04 LAB
CALCIUM SERPL-MCNC: 8.8 MG/DL (ref 8.3–10.5)
CO2 SERPL-SCNC: 24 MMOL/L (ref 23–31)
GLUCOSE SERPL-MCNC: 160 MG/DL (ref 65–105)
MAGNESIUM SERPL-MCNC: 2.53 MG/DL (ref 1.6–2.6)
POTASSIUM SERPL-SCNC: 4.3 MMOL/L (ref 3.5–5.1)
SODIUM SERPL-SCNC: 141 MMOL/L (ref 136–145)

## 2022-05-04 NOTE — NUR
PCT informed nurse that patient is complaining of blurred vision and
dizziness. . Advised PCT to check BP. BP 81/34. Elevated legs
and reclined patient back in chair. Discussed with STEPHON Chicas. Advised to
push fluids and disregard 2000 ml fluid restriction at this time, and to
recheck a manual BP. Manual BP 92/44. Patient verbalized feeling better.
Advised PCT to recheck BP in 30 minutes.

## 2022-05-04 NOTE — NUR
Patient A&O. Encouraged by the distance she was able to walk today and seems
to be in a good mood throughout the shift. No episodes of tearfulness. No
compliants of pain. Ambulates to the bathroom with minimal difficulty. Resting
in bed after supper. External catheter placed per patient request. Call light
in reach, bed alarm on.

## 2022-05-04 NOTE — NUR
AM medication taken whole without difficulty. Patient reports she "slept well"
until 0400 and "I feel better". Denies pain or needs this am.

## 2022-05-04 NOTE — NUR
Patient rests in bed. Denies pain . HS meds along with tylenol for pain
prevention reviewed and given.

## 2022-05-05 VITALS — DIASTOLIC BLOOD PRESSURE: 55 MMHG | SYSTOLIC BLOOD PRESSURE: 157 MMHG

## 2022-05-05 VITALS — DIASTOLIC BLOOD PRESSURE: 55 MMHG | SYSTOLIC BLOOD PRESSURE: 146 MMHG

## 2022-05-05 VITALS — SYSTOLIC BLOOD PRESSURE: 120 MMHG | DIASTOLIC BLOOD PRESSURE: 58 MMHG

## 2022-05-05 NOTE — NUR
Pt a/o x4. Pt sitting in chair with legs down eating breakfast.  pt states
that she did not sleep well. Pt anxious to be discharged and return home. Pt
took medicine well.  Pt in chair, alarm set, call light within reach.

## 2022-05-05 NOTE — NUR
Patient reports she didn't sleep well this noc. Assisted up to the bathroom
with platform walker then rests back in bed.

## 2022-05-05 NOTE — NUR
Patient resting in bed awake. Pure wick urinary external catheter in place. HS
meds along with tylenol for left leg achyness reviewed and given. Finished
enlive that was given earlier. Leg ace wraps removed and gold bond lotion
applied. Patient alert and oriented x 4.

## 2022-05-06 VITALS — SYSTOLIC BLOOD PRESSURE: 138 MMHG | DIASTOLIC BLOOD PRESSURE: 63 MMHG

## 2022-05-06 VITALS — SYSTOLIC BLOOD PRESSURE: 158 MMHG | DIASTOLIC BLOOD PRESSURE: 60 MMHG

## 2022-05-06 NOTE — NUR
Assessment charted. Pt doing well. Sitting in chair at side of bed, able to
ambulate with walker and SBA, no longer requiring platform walker. Legs have
improved immensely and are no longer scaling but pink and dry. Denies pain.
Taking PO well. Will continue to monitor.

## 2022-05-06 NOTE — NUR
Patient awake and states she slept good this noc. "woke up a couple of times
and fell right back to sleep".

## 2022-05-06 NOTE — NUR
Report received from Kayla GARCIA. Up in W/C. Requests and given Tylenol for R hip
pain 7/10. States "I walked alot today and it hurts". Assessment completed.
Denies wants or needs at this time.

## 2022-05-06 NOTE — NUR
Pt has done well over shift. Resting in chair most of day, has had visitors.
Moving well with walker. Refusing to wear any AMY hose, states she is "adamant
about refusing these". Will give report to [facility] nurse who will resume
care.

## 2022-05-07 VITALS — SYSTOLIC BLOOD PRESSURE: 154 MMHG | DIASTOLIC BLOOD PRESSURE: 63 MMHG

## 2022-05-07 VITALS — DIASTOLIC BLOOD PRESSURE: 58 MMHG | SYSTOLIC BLOOD PRESSURE: 135 MMHG

## 2022-05-07 NOTE — NUR
Patient A&Ox4. Sitting up in recliner. Reports sleeping well last night.
Denies pain. Assessment complete. +2 edema to BLE, ACE wraps applied. Needs
met. Fall precautions in place.

## 2022-05-07 NOTE — NUR
Report received from Erica GARCIA. Patient sitting up in recliner with legs
elevated. Ace wraps in place.  Watching TV. A/O x4. Denies pain or needs at
this time. Assessment completed.

## 2022-05-08 VITALS — DIASTOLIC BLOOD PRESSURE: 62 MMHG | SYSTOLIC BLOOD PRESSURE: 134 MMHG

## 2022-05-08 VITALS — SYSTOLIC BLOOD PRESSURE: 162 MMHG | DIASTOLIC BLOOD PRESSURE: 63 MMHG

## 2022-05-08 NOTE — NUR
Patient rests in bed with eyes closed. Awakened for HS meds and tylenol given
per request to aid with sleep. Denies pain at this time. BLE ace wraps removed
and gold bond lotion applied to lower legs and feet. Patient going without
pure wick catheter tonight.

## 2022-05-08 NOTE — NUR
Patient A&Ox4. Reports having interrupted sleeping but overall slept well.
Denies pain. Assessment completed. Dependent redness remains to BLE. Needs
met. Fall precautions in place.

## 2022-05-08 NOTE — NUR
Rested well until early this AM per report. AM medication and vital signs
obtained. Denies pain. Weight unchanged from yesterday AM.

## 2022-05-09 VITALS — DIASTOLIC BLOOD PRESSURE: 55 MMHG | SYSTOLIC BLOOD PRESSURE: 156 MMHG

## 2022-05-09 VITALS — SYSTOLIC BLOOD PRESSURE: 128 MMHG | DIASTOLIC BLOOD PRESSURE: 53 MMHG

## 2022-05-09 NOTE — NUR
Patient rests in bed and HS meds along with tylenol reviewed and given. Denies
pain at this time. Alert and oriented x 4. Ace wraps off for the night and
gold bond lotion applied.

## 2022-05-09 NOTE — NUR
Patient just returned from the bathroom to void and have bm. Rests in bed and
states "yes I did" to getting good sleep this noc.

## 2022-05-10 VITALS — SYSTOLIC BLOOD PRESSURE: 131 MMHG | DIASTOLIC BLOOD PRESSURE: 61 MMHG

## 2022-05-10 VITALS — SYSTOLIC BLOOD PRESSURE: 178 MMHG | DIASTOLIC BLOOD PRESSURE: 68 MMHG

## 2022-05-10 LAB
BASOPHILS # BLD: 0.05 K/MM3 (ref 0.02–0.1)
CALCIUM SERPL-MCNC: 8.8 MG/DL (ref 8.3–10.5)
CO2 SERPL-SCNC: 22 MMOL/L (ref 23–31)
EOSINOPHIL # BLD: 0.09 K/MM3 (ref 0.04–0.4)
EOSINOPHIL NFR BLD: 2 % (ref 1–5)
ERYTHROCYTE [DISTWIDTH] IN BLOOD BY AUTOMATED COUNT: 14 % (ref 11.5–14.5)
GLUCOSE SERPL-MCNC: 192 MG/DL (ref 65–105)
HCT VFR BLD AUTO: 39 % (ref 37–47)
HGB BLD-MCNC: 12.5 G/DL (ref 12.5–16)
LYMPHOCYTES # BLD: 0.71 K/MM3 (ref 1.5–4)
MCH RBC QN AUTO: 31 PG (ref 27–31)
MCHC RBC AUTO-ENTMCNC: 32 G/DL (ref 33–37)
MCV RBC AUTO: 97 FL (ref 78–100)
MONOCYTES # BLD: 0.65 K/MM3 (ref 0.2–0.8)
NEUTROPHILS # BLD: 3.02 K/MM3 (ref 1.4–6.5)
PLATELET # BLD AUTO: 237 K/MM3 (ref 130–400)
PMV BLD AUTO: 11.4 FL (ref 7.4–10.4)
POTASSIUM SERPL-SCNC: 4.1 MMOL/L (ref 3.5–5.1)
RBC # BLD AUTO: 4.03 M/MM3 (ref 4.1–5.3)
SODIUM SERPL-SCNC: 139 MMOL/L (ref 136–145)
WBC # BLD AUTO: 4.5 K/MM3 (ref 4.8–10.8)

## 2022-05-10 NOTE — NUR
Report received from Kayla GARCIA. Patient resting in bed. A/O x4. Denies pain.
Assessment completed. Accu-check 234. HS medications and scheduled [place]
given. Denies wants or needs. Bed alarm on. Call light in reach.

## 2022-05-10 NOTE — NUR
Pt a/o x4. Pt sitting in chair eating breakfast. She states she slept well
except for getting up to void x4. This nurse wrapped BLE with ACE bandages. Pt
in chair, feet elevated, alarm set, call light within reach. Pt denies any
pain.

## 2022-05-10 NOTE — NUR
Patient up to the bathroom with walker accompanied by CNA and back to bed.
States she's been sleeping "off and on".

## 2022-05-11 VITALS — SYSTOLIC BLOOD PRESSURE: 149 MMHG | DIASTOLIC BLOOD PRESSURE: 69 MMHG

## 2022-05-11 NOTE — NUR
Assessment charted. PT anticipating dishcarge today, looking forward to
discharge and hoping to dishcarge today. Up walking around in room with walker
and gait belt. ACE wraps applied bilaterally to legs, educated pt on wrapping
from toe towards knee.  Pt denies pain. Resting in chair at side of bed. Per
Monse okay to take home our walker temporarily until home walker can be
delivered by [facility]. Will continue to monitor.

## 2022-05-11 NOTE — NUR
Discharge completed at this time. Pt received discharge packet. Reviewed f/u
appointments, medications, answered all questions. Pt able to go home with
[facility] walker and will return on 5/18 for f/u with Lily and will
return walker at that time, okay per Marian John and Monse Givens. Daughter
at bedside. Pt left via w/c with [facility] staff with all belongings, daughter to
drive home, criteria met. Discussed events with patient and significant other. Labs/testing and pathology options reviewed in detail. Patient opts for lab studies and microarray analysis at this time. Will consider autopsy and let RN know. Grief stages, coping, and available resources discussed. Questions answered. Plan of care discussed. Patient expressed understanding and agreement.

## 2022-05-23 ENCOUNTER — HOSPITAL ENCOUNTER (OUTPATIENT)
Dept: HOSPITAL 6 - LAB | Age: 87
End: 2022-05-23
Attending: FAMILY MEDICINE
Payer: MEDICARE

## 2022-05-23 DIAGNOSIS — I95.1: Primary | ICD-10-CM

## 2022-05-24 LAB
ALBUMIN SERPL-MCNC: 3.9 G/DL (ref 3.4–4.8)
ALT SERPL-CCNC: 10 U/L (ref 0–55)
AST SERPL-CCNC: 15 U/L (ref 5–34)
BILIRUB SERPL-MCNC: 0.4 MG/DL (ref 0.2–1.2)
CALCIUM SERPL-MCNC: 10.1 MG/DL (ref 8.3–10.5)
CO2 SERPL-SCNC: 27 MMOL/L (ref 23–31)
GLUCOSE SERPL-MCNC: 213 MG/DL (ref 65–105)
POTASSIUM SERPL-SCNC: 5.2 MMOL/L (ref 3.5–5.1)
PROT SERPL-MCNC: 6.4 G/DL (ref 6.2–8.1)
SODIUM SERPL-SCNC: 137 MMOL/L (ref 136–145)